# Patient Record
Sex: MALE | Race: BLACK OR AFRICAN AMERICAN | Employment: FULL TIME | URBAN - METROPOLITAN AREA
[De-identification: names, ages, dates, MRNs, and addresses within clinical notes are randomized per-mention and may not be internally consistent; named-entity substitution may affect disease eponyms.]

---

## 2017-02-18 LAB
ALBUMIN SERPL-MCNC: 4.5 G/DL
ALP BLD-CCNC: 52 U/L
ALT SERPL-CCNC: 14 U/L
AST SERPL-CCNC: 17 U/L
BILIRUB SERPL-MCNC: 0.9 MG/DL (ref 0.1–1.4)
BUN BLDV-MCNC: 19 MG/DL
CALCIUM SERPL-MCNC: 9.3 MG/DL
CHLORIDE BLD-SCNC: 103 MMOL/L
CHOLESTEROL, TOTAL: 193 MG/DL
CHOLESTEROL/HDL RATIO: ABNORMAL
CO2: 26 MMOL/L
CREAT SERPL-MCNC: 1.15 MG/DL
GFR CALCULATED: NORMAL
GLUCOSE BLD-MCNC: 69 MG/DL
HDLC SERPL-MCNC: 76 MG/DL (ref 35–70)
LDL CHOLESTEROL CALCULATED: 110 MG/DL (ref 0–160)
POTASSIUM SERPL-SCNC: 4.6 MMOL/L
SODIUM BLD-SCNC: 140 MMOL/L
TOTAL PROTEIN: 7.2
TRIGL SERPL-MCNC: 37 MG/DL
VLDLC SERPL CALC-MCNC: ABNORMAL MG/DL

## 2017-02-24 ENCOUNTER — OFFICE VISIT (OUTPATIENT)
Dept: FAMILY MEDICINE CLINIC | Age: 47
End: 2017-02-24

## 2017-02-24 VITALS
BODY MASS INDEX: 25.98 KG/M2 | SYSTOLIC BLOOD PRESSURE: 108 MMHG | HEIGHT: 73 IN | TEMPERATURE: 98.6 F | RESPIRATION RATE: 22 BRPM | HEART RATE: 103 BPM | OXYGEN SATURATION: 98 % | WEIGHT: 196 LBS | DIASTOLIC BLOOD PRESSURE: 70 MMHG

## 2017-02-24 DIAGNOSIS — R05.9 COUGH: Primary | ICD-10-CM

## 2017-02-24 DIAGNOSIS — R52 BODY ACHES: ICD-10-CM

## 2017-02-24 DIAGNOSIS — J20.9 ACUTE BRONCHITIS, UNSPECIFIED ORGANISM: ICD-10-CM

## 2017-02-24 LAB
INFLUENZA A ANTIBODY: NORMAL
INFLUENZA B ANTIBODY: NORMAL

## 2017-02-24 PROCEDURE — 87804 INFLUENZA ASSAY W/OPTIC: CPT | Performed by: NURSE PRACTITIONER

## 2017-02-24 PROCEDURE — 1036F TOBACCO NON-USER: CPT | Performed by: NURSE PRACTITIONER

## 2017-02-24 PROCEDURE — G8484 FLU IMMUNIZE NO ADMIN: HCPCS | Performed by: NURSE PRACTITIONER

## 2017-02-24 PROCEDURE — 99213 OFFICE O/P EST LOW 20 MIN: CPT | Performed by: NURSE PRACTITIONER

## 2017-02-24 PROCEDURE — G8427 DOCREV CUR MEDS BY ELIG CLIN: HCPCS | Performed by: NURSE PRACTITIONER

## 2017-02-24 PROCEDURE — G8419 CALC BMI OUT NRM PARAM NOF/U: HCPCS | Performed by: NURSE PRACTITIONER

## 2017-02-24 RX ORDER — GUAIFENESIN AND CODEINE PHOSPHATE 100; 10 MG/5ML; MG/5ML
5 SOLUTION ORAL EVERY 4 HOURS PRN
Qty: 120 ML | Refills: 0 | Status: SHIPPED | OUTPATIENT
Start: 2017-02-24 | End: 2017-03-03

## 2017-02-24 RX ORDER — CLARITHROMYCIN 500 MG/1
500 TABLET, COATED ORAL 2 TIMES DAILY
Qty: 20 TABLET | Refills: 0 | Status: SHIPPED | OUTPATIENT
Start: 2017-02-24 | End: 2017-03-06

## 2017-02-24 ASSESSMENT — PATIENT HEALTH QUESTIONNAIRE - PHQ9
SUM OF ALL RESPONSES TO PHQ9 QUESTIONS 1 & 2: 0
2. FEELING DOWN, DEPRESSED OR HOPELESS: 0
1. LITTLE INTEREST OR PLEASURE IN DOING THINGS: 0
SUM OF ALL RESPONSES TO PHQ QUESTIONS 1-9: 0

## 2017-02-27 ENCOUNTER — TELEPHONE (OUTPATIENT)
Dept: FAMILY MEDICINE CLINIC | Age: 47
End: 2017-02-27

## 2017-02-27 RX ORDER — AZITHROMYCIN 250 MG/1
TABLET, FILM COATED ORAL
Qty: 1 PACKET | Refills: 0 | Status: SHIPPED | OUTPATIENT
Start: 2017-02-27 | End: 2017-03-09

## 2017-03-12 ENCOUNTER — TELEPHONE (OUTPATIENT)
Dept: FAMILY MEDICINE CLINIC | Age: 47
End: 2017-03-12

## 2017-08-17 ENCOUNTER — OFFICE VISIT (OUTPATIENT)
Dept: FAMILY MEDICINE CLINIC | Age: 47
End: 2017-08-17

## 2017-08-17 VITALS
TEMPERATURE: 97.5 F | OXYGEN SATURATION: 97 % | HEART RATE: 74 BPM | DIASTOLIC BLOOD PRESSURE: 66 MMHG | WEIGHT: 201.5 LBS | SYSTOLIC BLOOD PRESSURE: 118 MMHG | BODY MASS INDEX: 26.71 KG/M2 | HEIGHT: 73 IN

## 2017-08-17 DIAGNOSIS — E78.5 MILD HYPERLIPIDEMIA: ICD-10-CM

## 2017-08-17 DIAGNOSIS — Z00.00 ANNUAL PHYSICAL EXAM: Primary | ICD-10-CM

## 2017-08-17 PROCEDURE — 99396 PREV VISIT EST AGE 40-64: CPT | Performed by: FAMILY MEDICINE

## 2017-08-17 ASSESSMENT — ENCOUNTER SYMPTOMS
EYE PAIN: 0
NAUSEA: 0
CHEST TIGHTNESS: 0
PHOTOPHOBIA: 0
WHEEZING: 0
EYE ITCHING: 0
VOICE CHANGE: 0
FACIAL SWELLING: 0
SINUS PRESSURE: 0
ANAL BLEEDING: 0
ABDOMINAL PAIN: 0
ABDOMINAL DISTENTION: 0
CHOKING: 0
BLOOD IN STOOL: 0
EYE REDNESS: 0
BACK PAIN: 0
COUGH: 0
APNEA: 0
EYE DISCHARGE: 0
SHORTNESS OF BREATH: 0
RHINORRHEA: 0
TROUBLE SWALLOWING: 0
DIARRHEA: 0
SORE THROAT: 0
CONSTIPATION: 0

## 2017-08-18 PROCEDURE — 90471 IMMUNIZATION ADMIN: CPT | Performed by: FAMILY MEDICINE

## 2017-08-18 PROCEDURE — 90715 TDAP VACCINE 7 YRS/> IM: CPT | Performed by: FAMILY MEDICINE

## 2018-06-21 ENCOUNTER — OFFICE VISIT (OUTPATIENT)
Dept: FAMILY MEDICINE CLINIC | Age: 48
End: 2018-06-21
Payer: COMMERCIAL

## 2018-06-21 VITALS
SYSTOLIC BLOOD PRESSURE: 128 MMHG | WEIGHT: 203.4 LBS | TEMPERATURE: 98.6 F | OXYGEN SATURATION: 98 % | DIASTOLIC BLOOD PRESSURE: 66 MMHG | BODY MASS INDEX: 26.96 KG/M2 | HEIGHT: 73 IN | RESPIRATION RATE: 16 BRPM | HEART RATE: 65 BPM

## 2018-06-21 DIAGNOSIS — H66.92 LEFT OTITIS MEDIA, UNSPECIFIED OTITIS MEDIA TYPE: Primary | ICD-10-CM

## 2018-06-21 PROCEDURE — G8427 DOCREV CUR MEDS BY ELIG CLIN: HCPCS | Performed by: FAMILY MEDICINE

## 2018-06-21 PROCEDURE — 1036F TOBACCO NON-USER: CPT | Performed by: FAMILY MEDICINE

## 2018-06-21 PROCEDURE — 99213 OFFICE O/P EST LOW 20 MIN: CPT | Performed by: FAMILY MEDICINE

## 2018-06-21 PROCEDURE — G8419 CALC BMI OUT NRM PARAM NOF/U: HCPCS | Performed by: FAMILY MEDICINE

## 2018-06-21 RX ORDER — CEFDINIR 300 MG/1
300 CAPSULE ORAL 2 TIMES DAILY
Qty: 20 CAPSULE | Refills: 0 | Status: SHIPPED | OUTPATIENT
Start: 2018-06-21 | End: 2018-07-01

## 2018-06-28 ASSESSMENT — ENCOUNTER SYMPTOMS
ABDOMINAL PAIN: 0
BLOOD IN STOOL: 0
DIARRHEA: 0
SORE THROAT: 0
SHORTNESS OF BREATH: 0
NAUSEA: 0
RHINORRHEA: 0
CHEST TIGHTNESS: 0
CONSTIPATION: 0
VOMITING: 0
COUGH: 0
APNEA: 0

## 2018-08-23 ENCOUNTER — HOSPITAL ENCOUNTER (OUTPATIENT)
Dept: LAB | Age: 48
Discharge: HOME | End: 2018-08-23
Payer: COMMERCIAL

## 2018-08-23 ENCOUNTER — OFFICE VISIT (OUTPATIENT)
Dept: FAMILY MEDICINE CLINIC | Age: 48
End: 2018-08-23
Payer: COMMERCIAL

## 2018-08-23 VITALS
SYSTOLIC BLOOD PRESSURE: 116 MMHG | OXYGEN SATURATION: 99 % | HEART RATE: 89 BPM | DIASTOLIC BLOOD PRESSURE: 62 MMHG | WEIGHT: 203.25 LBS | TEMPERATURE: 97.6 F | HEIGHT: 74 IN | RESPIRATION RATE: 18 BRPM | BODY MASS INDEX: 26.08 KG/M2

## 2018-08-23 DIAGNOSIS — Z00.00 ANNUAL PHYSICAL EXAM: ICD-10-CM

## 2018-08-23 DIAGNOSIS — Z00.00 ANNUAL PHYSICAL EXAM: Primary | ICD-10-CM

## 2018-08-23 DIAGNOSIS — J30.9 ALLERGIC RHINITIS, UNSPECIFIED SEASONALITY, UNSPECIFIED TRIGGER: ICD-10-CM

## 2018-08-23 LAB
ALBUMIN SERPL-MCNC: 4.4 G/DL (ref 3.9–4.9)
ALP BLD-CCNC: 54 U/L (ref 35–104)
ALT SERPL-CCNC: 22 U/L (ref 0–41)
ANION GAP SERPL CALCULATED.3IONS-SCNC: 12 MEQ/L (ref 7–13)
AST SERPL-CCNC: 21 U/L (ref 0–40)
BASOPHILS ABSOLUTE: 0 K/UL (ref 0–0.2)
BASOPHILS RELATIVE PERCENT: 0.4 %
BILIRUB SERPL-MCNC: 0.8 MG/DL (ref 0–1.2)
BUN BLDV-MCNC: 16 MG/DL (ref 6–20)
CALCIUM SERPL-MCNC: 9.2 MG/DL (ref 8.6–10.2)
CHLORIDE BLD-SCNC: 103 MEQ/L (ref 98–107)
CHOLESTEROL, TOTAL: 201 MG/DL (ref 0–199)
CO2: 27 MEQ/L (ref 22–29)
CREAT SERPL-MCNC: 1.23 MG/DL (ref 0.7–1.2)
EOSINOPHILS ABSOLUTE: 0.1 K/UL (ref 0–0.7)
EOSINOPHILS RELATIVE PERCENT: 2.2 %
GFR AFRICAN AMERICAN: >60
GFR NON-AFRICAN AMERICAN: >60
GLOBULIN: 3.1 G/DL (ref 2.3–3.5)
GLUCOSE BLD-MCNC: 89 MG/DL (ref 74–109)
HCT VFR BLD CALC: 41.3 % (ref 42–52)
HDLC SERPL-MCNC: 67 MG/DL (ref 40–59)
HEMOGLOBIN: 14.2 G/DL (ref 14–18)
LDL CHOLESTEROL CALCULATED: 123 MG/DL (ref 0–129)
LYMPHOCYTES ABSOLUTE: 1.8 K/UL (ref 1–4.8)
LYMPHOCYTES RELATIVE PERCENT: 38.8 %
MCH RBC QN AUTO: 34.2 PG (ref 27–31.3)
MCHC RBC AUTO-ENTMCNC: 34.3 % (ref 33–37)
MCV RBC AUTO: 99.6 FL (ref 80–100)
MONOCYTES ABSOLUTE: 0.5 K/UL (ref 0.2–0.8)
MONOCYTES RELATIVE PERCENT: 9.6 %
NEUTROPHILS ABSOLUTE: 2.3 K/UL (ref 1.4–6.5)
NEUTROPHILS RELATIVE PERCENT: 49 %
PDW BLD-RTO: 12.6 % (ref 11.5–14.5)
PLATELET # BLD: 257 K/UL (ref 130–400)
POTASSIUM SERPL-SCNC: 4.8 MEQ/L (ref 3.5–5.1)
PROSTATE SPECIFIC ANTIGEN: 0.79 NG/ML
RBC # BLD: 4.14 M/UL (ref 4.7–6.1)
SODIUM BLD-SCNC: 142 MEQ/L (ref 132–144)
TOTAL PROTEIN: 7.5 G/DL (ref 6.4–8.1)
TRIGL SERPL-MCNC: 53 MG/DL (ref 0–200)
WBC # BLD: 4.7 K/UL (ref 4.8–10.8)

## 2018-08-23 PROCEDURE — 80061 LIPID PANEL: CPT

## 2018-08-23 PROCEDURE — 85025 COMPLETE CBC W/AUTO DIFF WBC: CPT

## 2018-08-23 PROCEDURE — 80053 COMPREHEN METABOLIC PANEL: CPT

## 2018-08-23 PROCEDURE — 36415 COLL VENOUS BLD VENIPUNCTURE: CPT

## 2018-08-23 PROCEDURE — 99396 PREV VISIT EST AGE 40-64: CPT | Performed by: FAMILY MEDICINE

## 2018-08-23 PROCEDURE — G0103 PSA SCREENING: HCPCS

## 2018-08-23 RX ORDER — FLUTICASONE PROPIONATE 50 MCG
1 SPRAY, SUSPENSION (ML) NASAL DAILY
Qty: 1 BOTTLE | Refills: 3 | Status: SHIPPED | OUTPATIENT
Start: 2018-08-23 | End: 2020-01-14

## 2018-08-23 RX ORDER — M-VIT,TX,IRON,MINS/CALC/FOLIC 27MG-0.4MG
1 TABLET ORAL DAILY
COMMUNITY
End: 2020-01-14

## 2018-08-23 ASSESSMENT — ENCOUNTER SYMPTOMS
EYE DISCHARGE: 0
RHINORRHEA: 0
SHORTNESS OF BREATH: 0
WHEEZING: 0
COUGH: 0
ABDOMINAL DISTENTION: 0
SINUS PRESSURE: 0
BLOOD IN STOOL: 0
EYE ITCHING: 0
NAUSEA: 0
EYE REDNESS: 0
ANAL BLEEDING: 0
SORE THROAT: 0
CONSTIPATION: 0
CHOKING: 0
ABDOMINAL PAIN: 0
APNEA: 0
DIARRHEA: 0
BACK PAIN: 0
TROUBLE SWALLOWING: 0
EYE PAIN: 0
CHEST TIGHTNESS: 0
VOICE CHANGE: 0
PHOTOPHOBIA: 0
FACIAL SWELLING: 0

## 2018-08-23 NOTE — PROGRESS NOTES
The 10-year ASCVD risk score (Talisha Gillette et al., 2013) is: 3.2%    Values used to calculate the score:      Age: 56 years      Sex: Male      Is Non- : Yes      Diabetic: No      Tobacco smoker: No      Systolic Blood Pressure: 770 mmHg      Is BP treated: No      HDL Cholesterol: 76 mg/dL      Total Cholesterol: 193 mg/dL

## 2018-08-29 DIAGNOSIS — N28.9 ACUTE KIDNEY INSUFFICIENCY: Primary | ICD-10-CM

## 2018-09-05 ENCOUNTER — TELEPHONE (OUTPATIENT)
Dept: FAMILY MEDICINE CLINIC | Age: 48
End: 2018-09-05

## 2018-09-05 NOTE — TELEPHONE ENCOUNTER
Patient called wanted to know his labs so I gave him the message and would like the labs faxed over to [facility]. cp

## 2018-09-24 ENCOUNTER — OFFICE VISIT (OUTPATIENT)
Dept: FAMILY MEDICINE CLINIC | Age: 48
End: 2018-09-24
Payer: COMMERCIAL

## 2018-09-24 VITALS
HEART RATE: 77 BPM | RESPIRATION RATE: 20 BRPM | TEMPERATURE: 96.8 F | DIASTOLIC BLOOD PRESSURE: 80 MMHG | HEIGHT: 73 IN | SYSTOLIC BLOOD PRESSURE: 122 MMHG | WEIGHT: 206 LBS | OXYGEN SATURATION: 97 % | BODY MASS INDEX: 27.3 KG/M2

## 2018-09-24 DIAGNOSIS — J06.9 VIRAL URI: Primary | ICD-10-CM

## 2018-09-24 PROCEDURE — 1036F TOBACCO NON-USER: CPT | Performed by: NURSE PRACTITIONER

## 2018-09-24 PROCEDURE — G8419 CALC BMI OUT NRM PARAM NOF/U: HCPCS | Performed by: NURSE PRACTITIONER

## 2018-09-24 PROCEDURE — 99213 OFFICE O/P EST LOW 20 MIN: CPT | Performed by: NURSE PRACTITIONER

## 2018-09-24 PROCEDURE — G8427 DOCREV CUR MEDS BY ELIG CLIN: HCPCS | Performed by: NURSE PRACTITIONER

## 2018-09-24 RX ORDER — DEXTROMETHORPHAN HYDROBROMIDE AND PROMETHAZINE HYDROCHLORIDE 15; 6.25 MG/5ML; MG/5ML
5 SYRUP ORAL 4 TIMES DAILY PRN
Qty: 150 ML | Refills: 0 | Status: SHIPPED | OUTPATIENT
Start: 2018-09-24 | End: 2018-10-01

## 2018-09-24 RX ORDER — CETIRIZINE HYDROCHLORIDE, PSEUDOEPHEDRINE HYDROCHLORIDE 5; 120 MG/1; MG/1
1 TABLET, FILM COATED, EXTENDED RELEASE ORAL 2 TIMES DAILY
Qty: 60 TABLET | Refills: 0 | Status: SHIPPED | OUTPATIENT
Start: 2018-09-24 | End: 2018-10-24

## 2018-09-24 ASSESSMENT — ENCOUNTER SYMPTOMS
SHORTNESS OF BREATH: 0
DIARRHEA: 0
SINUS PAIN: 0
RHINORRHEA: 1
VOMITING: 0
BLOOD IN STOOL: 0
SORE THROAT: 0
NAUSEA: 0
ANAL BLEEDING: 0
CHEST TIGHTNESS: 0
WHEEZING: 0
SWOLLEN GLANDS: 0
ABDOMINAL DISTENTION: 0
COUGH: 1
CONSTIPATION: 0
ABDOMINAL PAIN: 0

## 2018-09-24 NOTE — PROGRESS NOTES
muscle usage. No respiratory distress. He has no decreased breath sounds. He has no wheezes. He has no rhonchi. He has no rales. He exhibits no tenderness. Lymphadenopathy:     He has no cervical adenopathy. Neurological: He is alert and oriented to person, place, and time. Skin: Skin is warm and dry. No rash noted. He is not diaphoretic. Psychiatric: He has a normal mood and affect. His behavior is normal.     Assessment & Plan:      Diagnosis Orders   1. Viral URI  cetirizine-psuedoephedrine (ZYRTEC-D) 5-120 MG per extended release tablet    promethazine-dextromethorphan (PROMETHAZINE-DM) 6.25-15 MG/5ML syrup     Orders Placed This Encounter   Medications    cetirizine-psuedoephedrine (ZYRTEC-D) 5-120 MG per extended release tablet     Sig: Take 1 tablet by mouth 2 times daily     Dispense:  60 tablet     Refill:  0    promethazine-dextromethorphan (PROMETHAZINE-DM) 6.25-15 MG/5ML syrup     Sig: Take 5 mLs by mouth 4 times daily as needed for Cough     Dispense:  150 mL     Refill:  0     Return if symptoms worsen or fail to improve, for follow up with PCP. No signs of bacterial infection on exam. Patient with likely viral URI based on symptoms and reported history. Patient to use supportive care at home - tylenol or ibuprofen (if not allergic or contraindicated based on medical history or other medication), increased fluids/rest, salt water gargles, throat lozenges, mucinex OTC BID x 5 days. Instructed to return to office if symptoms worsen or do not improve over the next 7-10 days. Reviewed with the patient: current clinical status, medications, activities and diet. Side effects, adverse effects of the medication prescribed today, as well as treatment plan/ rationale and result expectations have been discussed with the patient who expresses understanding and desires to proceed.   Pt instructions reviewed and given to patient.     Close follow up to evaluate treatment results and for

## 2019-02-09 ENCOUNTER — OFFICE VISIT (OUTPATIENT)
Dept: FAMILY MEDICINE CLINIC | Age: 49
End: 2019-02-09
Payer: COMMERCIAL

## 2019-02-09 VITALS
SYSTOLIC BLOOD PRESSURE: 110 MMHG | WEIGHT: 203 LBS | TEMPERATURE: 98 F | HEIGHT: 73 IN | DIASTOLIC BLOOD PRESSURE: 60 MMHG | BODY MASS INDEX: 26.9 KG/M2 | HEART RATE: 70 BPM | OXYGEN SATURATION: 98 % | RESPIRATION RATE: 18 BRPM

## 2019-02-09 DIAGNOSIS — J01.10 ACUTE NON-RECURRENT FRONTAL SINUSITIS: Primary | ICD-10-CM

## 2019-02-09 PROCEDURE — 99213 OFFICE O/P EST LOW 20 MIN: CPT | Performed by: NURSE PRACTITIONER

## 2019-02-09 PROCEDURE — G8427 DOCREV CUR MEDS BY ELIG CLIN: HCPCS | Performed by: NURSE PRACTITIONER

## 2019-02-09 PROCEDURE — G8419 CALC BMI OUT NRM PARAM NOF/U: HCPCS | Performed by: NURSE PRACTITIONER

## 2019-02-09 PROCEDURE — G8484 FLU IMMUNIZE NO ADMIN: HCPCS | Performed by: NURSE PRACTITIONER

## 2019-02-09 PROCEDURE — 1036F TOBACCO NON-USER: CPT | Performed by: NURSE PRACTITIONER

## 2019-02-09 RX ORDER — DOXYCYCLINE HYCLATE 100 MG
100 TABLET ORAL 2 TIMES DAILY
Qty: 14 TABLET | Refills: 0 | Status: SHIPPED | OUTPATIENT
Start: 2019-02-09 | End: 2019-02-16

## 2019-02-09 ASSESSMENT — ENCOUNTER SYMPTOMS
CONSTIPATION: 0
SWOLLEN GLANDS: 0
SORE THROAT: 1
NAUSEA: 0
CHEST TIGHTNESS: 0
VOMITING: 0
ABDOMINAL DISTENTION: 0
WHEEZING: 0
SINUS PRESSURE: 1
SHORTNESS OF BREATH: 0
ABDOMINAL PAIN: 0
COUGH: 0
BLOOD IN STOOL: 0
HOARSE VOICE: 0
DIARRHEA: 0
ANAL BLEEDING: 0

## 2019-02-09 ASSESSMENT — PATIENT HEALTH QUESTIONNAIRE - PHQ9
2. FEELING DOWN, DEPRESSED OR HOPELESS: 0
SUM OF ALL RESPONSES TO PHQ QUESTIONS 1-9: 0
SUM OF ALL RESPONSES TO PHQ9 QUESTIONS 1 & 2: 0
1. LITTLE INTEREST OR PLEASURE IN DOING THINGS: 0
SUM OF ALL RESPONSES TO PHQ QUESTIONS 1-9: 0

## 2019-05-10 ENCOUNTER — OFFICE VISIT (OUTPATIENT)
Dept: FAMILY MEDICINE CLINIC | Age: 49
End: 2019-05-10
Payer: COMMERCIAL

## 2019-05-10 VITALS
BODY MASS INDEX: 27.5 KG/M2 | HEART RATE: 66 BPM | OXYGEN SATURATION: 98 % | SYSTOLIC BLOOD PRESSURE: 120 MMHG | HEIGHT: 72 IN | RESPIRATION RATE: 16 BRPM | DIASTOLIC BLOOD PRESSURE: 80 MMHG | WEIGHT: 203 LBS | TEMPERATURE: 97.6 F

## 2019-05-10 DIAGNOSIS — H61.23 BILATERAL IMPACTED CERUMEN: Primary | ICD-10-CM

## 2019-05-10 DIAGNOSIS — H93.8X3 EAR CONGESTION, BILATERAL: ICD-10-CM

## 2019-05-10 PROCEDURE — 1036F TOBACCO NON-USER: CPT | Performed by: PHYSICIAN ASSISTANT

## 2019-05-10 PROCEDURE — 69210 REMOVE IMPACTED EAR WAX UNI: CPT | Performed by: PHYSICIAN ASSISTANT

## 2019-05-10 PROCEDURE — G8427 DOCREV CUR MEDS BY ELIG CLIN: HCPCS | Performed by: PHYSICIAN ASSISTANT

## 2019-05-10 PROCEDURE — 99213 OFFICE O/P EST LOW 20 MIN: CPT | Performed by: PHYSICIAN ASSISTANT

## 2019-05-10 PROCEDURE — G8419 CALC BMI OUT NRM PARAM NOF/U: HCPCS | Performed by: PHYSICIAN ASSISTANT

## 2019-05-10 ASSESSMENT — ENCOUNTER SYMPTOMS
VOMITING: 0
SORE THROAT: 0
ABDOMINAL PAIN: 0
COUGH: 0
RHINORRHEA: 0
DIARRHEA: 0

## 2019-05-10 NOTE — PROGRESS NOTES
Subjective     Fredrick Corona Sr. 53 y.o. male presents 5/10/19 with   Chief Complaint   Patient presents with   Juan Ruiz     pt comes in today with complaint of left ear pain for past 2 days---ah       Otalgia    There is pain in the left ear. This is a new problem. The current episode started in the past 7 days. The problem occurs constantly. The problem has been unchanged. There has been no fever. Associated symptoms include hearing loss. Pertinent negatives include no abdominal pain, coughing, diarrhea, ear discharge, headaches, neck pain, rash, rhinorrhea, sore throat or vomiting. He has tried nothing for the symptoms. Reviewed thefollowing history:    Past Medical History:   Diagnosis Date    Allergic rhinitis     Mild anemia     Mild hyperlipidemia      Past Surgical History:   Procedure Laterality Date    WISDOM TOOTH EXTRACTION       Family History   Problem Relation Age of Onset    Emphysema Father        Allergies   Allergen Reactions    Pcn [Penicillins] Nausea And Vomiting       Current Outpatient Medications   Medication Sig Dispense Refill    Multiple Vitamins-Minerals (THERAPEUTIC MULTIVITAMIN-MINERALS) tablet Take 1 tablet by mouth daily      fluticasone (FLONASE) 50 MCG/ACT nasal spray 1 spray by Nasal route daily 1 Bottle 3     No current facility-administered medications for this visit. Review of Systems   HENT: Positive for ear pain and hearing loss. Negative for ear discharge, rhinorrhea and sore throat. Respiratory: Negative for cough. Gastrointestinal: Negative for abdominal pain, diarrhea and vomiting. Musculoskeletal: Negative for neck pain. Skin: Negative for rash. Neurological: Negative for headaches.        Objective    Vitals:    05/10/19 1611   BP: 120/80   Site: Left Upper Arm   Position: Sitting   Cuff Size: Small Adult   Pulse: 66   Resp: 16   Temp: 97.6 °F (36.4 °C)   TempSrc: Temporal   SpO2: 98%   Weight: 203 lb (92.1 kg)   Height: 6' (1.829 m) Physical Exam   Constitutional: He appears well-developed and well-nourished. No distress. HENT:   Right Ear: External ear normal.   Left Ear: External ear normal.   Mouth/Throat: No oropharyngeal exudate. Bilateral cerumen impaction   Eyes: Pupils are equal, round, and reactive to light. Cardiovascular: Normal rate and regular rhythm. Pulmonary/Chest: Effort normal and breath sounds normal.   Skin: He is not diaphoretic. Vitals reviewed. Assessment and Plan      ICD-10-CM    1. Bilateral impacted cerumen H61.23 27191 - [place] REMOVE IMPACTED EAR WAX   2. Ear congestion, bilateral H93.8X3        Orders Placed This Encounter   Procedures    56477 - [place] REMOVE IMPACTED EAR WAX     Both ear canals were irrigated with a 1:1 mixture of warm water and hydrogen peroxide. Soft brown wax was removed. The patient tolerated the procedure well without any adverse side effects. After the irrigation, both ear canals were clearly visualized by otoscope. No signs of infection were present in either ear canal or tympanic membrane. Use Flonase nightly until inner ear congestion improves. No orders of the defined types were placed in this encounter. Reviewed with the patient: current clinicalstatus, medications, activities and diet. Side effects, adverse effects of the medication prescribed today, as well as treatment plan and result expectations have been discussed with the patient who expressesunderstanding and desires to proceed. Close follow up to evaluate treatment results and for coordination of care. I have reviewed the patient's medical history in detail and updated the computerized patientrecord. Return if symptoms worsen or fail to improve, for follow up with PCP.     HARRIS Nova

## 2019-08-23 ENCOUNTER — HOSPITAL ENCOUNTER (OUTPATIENT)
Dept: LAB | Age: 49
Discharge: HOME | End: 2019-08-23
Payer: COMMERCIAL

## 2019-08-23 ENCOUNTER — OFFICE VISIT (OUTPATIENT)
Dept: FAMILY MEDICINE CLINIC | Age: 49
End: 2019-08-23
Payer: COMMERCIAL

## 2019-08-23 VITALS
HEIGHT: 72 IN | WEIGHT: 211.4 LBS | BODY MASS INDEX: 28.63 KG/M2 | SYSTOLIC BLOOD PRESSURE: 128 MMHG | OXYGEN SATURATION: 99 % | TEMPERATURE: 97.2 F | DIASTOLIC BLOOD PRESSURE: 84 MMHG | HEART RATE: 81 BPM | RESPIRATION RATE: 15 BRPM

## 2019-08-23 DIAGNOSIS — Z00.00 ANNUAL PHYSICAL EXAM: ICD-10-CM

## 2019-08-23 DIAGNOSIS — Z00.00 ANNUAL PHYSICAL EXAM: Primary | ICD-10-CM

## 2019-08-23 LAB
ALBUMIN SERPL-MCNC: 4.2 G/DL (ref 3.5–4.6)
ALP BLD-CCNC: 54 U/L (ref 35–104)
ALT SERPL-CCNC: 29 U/L (ref 0–41)
ANION GAP SERPL CALCULATED.3IONS-SCNC: 12 MEQ/L (ref 9–15)
AST SERPL-CCNC: 23 U/L (ref 0–40)
BASOPHILS ABSOLUTE: 0 K/UL (ref 0–0.2)
BASOPHILS RELATIVE PERCENT: 0.6 %
BILIRUB SERPL-MCNC: 0.6 MG/DL (ref 0.2–0.7)
BUN BLDV-MCNC: 15 MG/DL (ref 6–20)
CALCIUM SERPL-MCNC: 9.1 MG/DL (ref 8.5–9.9)
CHLORIDE BLD-SCNC: 101 MEQ/L (ref 95–107)
CHOLESTEROL, TOTAL: 180 MG/DL (ref 0–199)
CO2: 27 MEQ/L (ref 20–31)
CREAT SERPL-MCNC: 1.11 MG/DL (ref 0.7–1.2)
CREATININE URINE: 176.3 MG/DL
EOSINOPHILS ABSOLUTE: 0.1 K/UL (ref 0–0.7)
EOSINOPHILS RELATIVE PERCENT: 2.2 %
GFR AFRICAN AMERICAN: >60
GFR NON-AFRICAN AMERICAN: >60
GLOBULIN: 3.2 G/DL (ref 2.3–3.5)
GLUCOSE BLD-MCNC: 92 MG/DL (ref 70–99)
HCT VFR BLD CALC: 40.6 % (ref 42–52)
HDLC SERPL-MCNC: 64 MG/DL (ref 40–59)
HEMOGLOBIN: 13.6 G/DL (ref 14–18)
LDL CHOLESTEROL CALCULATED: 104 MG/DL (ref 0–129)
LYMPHOCYTES ABSOLUTE: 1.7 K/UL (ref 1–4.8)
LYMPHOCYTES RELATIVE PERCENT: 42.3 %
MCH RBC QN AUTO: 33.7 PG (ref 27–31.3)
MCHC RBC AUTO-ENTMCNC: 33.5 % (ref 33–37)
MCV RBC AUTO: 100.5 FL (ref 80–100)
MICROALBUMIN UR-MCNC: <1.2 MG/DL
MICROALBUMIN/CREAT UR-RTO: NORMAL MG/G (ref 0–30)
MONOCYTES ABSOLUTE: 0.4 K/UL (ref 0.2–0.8)
MONOCYTES RELATIVE PERCENT: 10.3 %
NEUTROPHILS ABSOLUTE: 1.8 K/UL (ref 1.4–6.5)
NEUTROPHILS RELATIVE PERCENT: 44.6 %
PDW BLD-RTO: 12.7 % (ref 11.5–14.5)
PLATELET # BLD: 261 K/UL (ref 130–400)
POTASSIUM SERPL-SCNC: 4.8 MEQ/L (ref 3.4–4.9)
PROSTATE SPECIFIC ANTIGEN: 0.85 NG/ML
RBC # BLD: 4.04 M/UL (ref 4.7–6.1)
SODIUM BLD-SCNC: 140 MEQ/L (ref 135–144)
TOTAL PROTEIN: 7.4 G/DL (ref 6.3–8)
TRIGL SERPL-MCNC: 62 MG/DL (ref 0–150)
WBC # BLD: 4.1 K/UL (ref 4.8–10.8)

## 2019-08-23 PROCEDURE — 80061 LIPID PANEL: CPT

## 2019-08-23 PROCEDURE — 80053 COMPREHEN METABOLIC PANEL: CPT

## 2019-08-23 PROCEDURE — 82570 ASSAY OF URINE CREATININE: CPT

## 2019-08-23 PROCEDURE — 82607 VITAMIN B-12: CPT

## 2019-08-23 PROCEDURE — 36415 COLL VENOUS BLD VENIPUNCTURE: CPT

## 2019-08-23 PROCEDURE — 82043 UR ALBUMIN QUANTITATIVE: CPT

## 2019-08-23 PROCEDURE — 85025 COMPLETE CBC W/AUTO DIFF WBC: CPT

## 2019-08-23 PROCEDURE — 99396 PREV VISIT EST AGE 40-64: CPT | Performed by: FAMILY MEDICINE

## 2019-08-23 PROCEDURE — 84153 ASSAY OF PSA TOTAL: CPT

## 2019-08-23 PROCEDURE — 82746 ASSAY OF FOLIC ACID SERUM: CPT

## 2019-08-23 ASSESSMENT — ENCOUNTER SYMPTOMS
SORE THROAT: 0
EYE PAIN: 0
NAUSEA: 0
CHEST TIGHTNESS: 0
PHOTOPHOBIA: 0
WHEEZING: 0
ABDOMINAL PAIN: 0
COUGH: 0
BLOOD IN STOOL: 0
VOICE CHANGE: 0
ANAL BLEEDING: 0
EYE DISCHARGE: 0
TROUBLE SWALLOWING: 0
APNEA: 0
SINUS PRESSURE: 0
ABDOMINAL DISTENTION: 0
SHORTNESS OF BREATH: 0
DIARRHEA: 0
BACK PAIN: 0
CHOKING: 0
EYE ITCHING: 0
FACIAL SWELLING: 0
RHINORRHEA: 0
CONSTIPATION: 0
EYE REDNESS: 0

## 2019-08-26 DIAGNOSIS — D53.9 MACROCYTIC ANEMIA: Primary | ICD-10-CM

## 2019-08-27 LAB
FOLATE: 13 NG/ML (ref 7.3–26.1)
VITAMIN B-12: 282 PG/ML (ref 232–1245)

## 2019-11-29 ENCOUNTER — OFFICE VISIT (OUTPATIENT)
Dept: FAMILY MEDICINE CLINIC | Age: 49
End: 2019-11-29
Payer: COMMERCIAL

## 2019-11-29 VITALS
HEART RATE: 75 BPM | WEIGHT: 212 LBS | HEIGHT: 72 IN | OXYGEN SATURATION: 98 % | TEMPERATURE: 97.5 F | RESPIRATION RATE: 16 BRPM | DIASTOLIC BLOOD PRESSURE: 70 MMHG | BODY MASS INDEX: 28.71 KG/M2 | SYSTOLIC BLOOD PRESSURE: 110 MMHG

## 2019-11-29 DIAGNOSIS — H61.23 BILATERAL IMPACTED CERUMEN: Primary | ICD-10-CM

## 2019-11-29 PROCEDURE — 69210 REMOVE IMPACTED EAR WAX UNI: CPT | Performed by: PHYSICIAN ASSISTANT

## 2019-11-29 PROCEDURE — 99213 OFFICE O/P EST LOW 20 MIN: CPT | Performed by: PHYSICIAN ASSISTANT

## 2019-11-29 PROCEDURE — G8427 DOCREV CUR MEDS BY ELIG CLIN: HCPCS | Performed by: PHYSICIAN ASSISTANT

## 2019-11-29 PROCEDURE — 1036F TOBACCO NON-USER: CPT | Performed by: PHYSICIAN ASSISTANT

## 2019-11-29 PROCEDURE — G8484 FLU IMMUNIZE NO ADMIN: HCPCS | Performed by: PHYSICIAN ASSISTANT

## 2019-11-29 PROCEDURE — G8419 CALC BMI OUT NRM PARAM NOF/U: HCPCS | Performed by: PHYSICIAN ASSISTANT

## 2019-11-30 ASSESSMENT — ENCOUNTER SYMPTOMS
ABDOMINAL PAIN: 0
SORE THROAT: 0
COUGH: 0

## 2020-01-14 ENCOUNTER — OFFICE VISIT (OUTPATIENT)
Dept: FAMILY MEDICINE CLINIC | Age: 50
End: 2020-01-14
Payer: COMMERCIAL

## 2020-01-14 VITALS
OXYGEN SATURATION: 99 % | BODY MASS INDEX: 28.31 KG/M2 | DIASTOLIC BLOOD PRESSURE: 84 MMHG | SYSTOLIC BLOOD PRESSURE: 134 MMHG | RESPIRATION RATE: 16 BRPM | HEIGHT: 73 IN | WEIGHT: 213.6 LBS | TEMPERATURE: 98.4 F | HEART RATE: 82 BPM

## 2020-01-14 PROCEDURE — 1036F TOBACCO NON-USER: CPT | Performed by: PHYSICIAN ASSISTANT

## 2020-01-14 PROCEDURE — 99213 OFFICE O/P EST LOW 20 MIN: CPT | Performed by: PHYSICIAN ASSISTANT

## 2020-01-14 PROCEDURE — G8427 DOCREV CUR MEDS BY ELIG CLIN: HCPCS | Performed by: PHYSICIAN ASSISTANT

## 2020-01-14 PROCEDURE — G8484 FLU IMMUNIZE NO ADMIN: HCPCS | Performed by: PHYSICIAN ASSISTANT

## 2020-01-14 PROCEDURE — G8419 CALC BMI OUT NRM PARAM NOF/U: HCPCS | Performed by: PHYSICIAN ASSISTANT

## 2020-01-14 RX ORDER — DOXYCYCLINE HYCLATE 100 MG
100 TABLET ORAL 2 TIMES DAILY
Qty: 20 TABLET | Refills: 0 | Status: SHIPPED | OUTPATIENT
Start: 2020-01-14 | End: 2020-01-24

## 2020-01-14 ASSESSMENT — ENCOUNTER SYMPTOMS
CHEST TIGHTNESS: 0
SORE THROAT: 0
APNEA: 0
SHORTNESS OF BREATH: 0
STRIDOR: 0
NAUSEA: 0
SINUS PAIN: 1
COUGH: 1
CHOKING: 0
RHINORRHEA: 0
WHEEZING: 1
SWOLLEN GLANDS: 0
VOMITING: 0
DIARRHEA: 0

## 2020-01-14 ASSESSMENT — PATIENT HEALTH QUESTIONNAIRE - PHQ9
1. LITTLE INTEREST OR PLEASURE IN DOING THINGS: 0
2. FEELING DOWN, DEPRESSED OR HOPELESS: 0
SUM OF ALL RESPONSES TO PHQ QUESTIONS 1-9: 0
SUM OF ALL RESPONSES TO PHQ9 QUESTIONS 1 & 2: 0
SUM OF ALL RESPONSES TO PHQ QUESTIONS 1-9: 0

## 2020-01-14 NOTE — PROGRESS NOTES
Subjective     Selvin Lenz Sr. 51 y.o. male presents 1/14/20 with   Chief Complaint   Patient presents with    Chest Congestion     Patient presents today with chest congestion, dry cough and chest tightness with cough. Patient states this has been ongoing x2 weeks. Patient also has had some sinus/head pressure. Patient has taken sudafed to help relieve sx's. URI    This is a new problem. The current episode started in the past 7 days. The problem has been gradually worsening. There has been no fever. Associated symptoms include congestion, coughing (productive), sinus pain and wheezing. Pertinent negatives include no chest pain, diarrhea, dysuria, ear pain, joint pain, nausea, plugged ear sensation, rhinorrhea, sneezing, sore throat, swollen glands or vomiting. Treatments tried: sudafed. The treatment provided mild relief. Reviewed the following history:    Past Medical History:   Diagnosis Date    Allergic rhinitis     Mild anemia     Mild hyperlipidemia      Past Surgical History:   Procedure Laterality Date    WISDOM TOOTH EXTRACTION       Family History   Problem Relation Age of Onset    Emphysema Father        Allergies   Allergen Reactions    Pcn [Penicillins] Nausea And Vomiting       Current Outpatient Medications   Medication Sig Dispense Refill    doxycycline hyclate (VIBRA-TABS) 100 MG tablet Take 1 tablet by mouth 2 times daily for 10 days 20 tablet 0     No current facility-administered medications for this visit. Review of Systems   Constitutional: Negative for chills and fever. HENT: Positive for congestion and sinus pain. Negative for ear pain, rhinorrhea, sneezing and sore throat. Respiratory: Positive for cough (productive) and wheezing. Negative for apnea, choking, chest tightness, shortness of breath and stridor. Cardiovascular: Negative for chest pain. Gastrointestinal: Negative for diarrhea, nausea and vomiting. Genitourinary: Negative for dysuria. Musculoskeletal: Negative for joint pain. Objective    Vitals:    01/14/20 1538   BP: 134/84   Site: Right Upper Arm   Position: Sitting   Cuff Size: Medium Adult   Pulse: 82   Resp: 16   Temp: 98.4 °F (36.9 °C)   TempSrc: Temporal   SpO2: 99%   Weight: 213 lb 9.6 oz (96.9 kg)   Height: 6' 1\" (1.854 m)       Physical Exam  Vitals signs reviewed. Constitutional:       General: He is not in acute distress. Appearance: Normal appearance. He is well-developed. He is not ill-appearing, toxic-appearing or diaphoretic. HENT:      Head: Normocephalic and atraumatic. Right Ear: Ear canal normal. A middle ear effusion (clear fluid) is present. Left Ear: Ear canal normal. A middle ear effusion (clear fluid) is present. Nose: Mucosal edema and rhinorrhea present. Mouth/Throat:      Pharynx: Uvula midline. Posterior oropharyngeal erythema (cobblestone appearance) present. No oropharyngeal exudate. Tonsils: No tonsillar exudate. Eyes:      General:         Right eye: No discharge. Left eye: No discharge. Conjunctiva/sclera: Conjunctivae normal.   Cardiovascular:      Rate and Rhythm: Normal rate and regular rhythm. Heart sounds: Normal heart sounds. Pulmonary:      Effort: Pulmonary effort is normal.      Breath sounds: Normal breath sounds. Lymphadenopathy:      Cervical: No cervical adenopathy. Skin:     General: Skin is warm. Coloration: Skin is not pale. Findings: No erythema or rash. Neurological:      General: No focal deficit present. Mental Status: He is alert. Assessment and Plan      ICD-10-CM    1. Acute bronchitis, unspecified organism J20.9    2.  Acute URI J06.9        Orders Placed This Encounter   Medications    doxycycline hyclate (VIBRA-TABS) 100 MG tablet     Sig: Take 1 tablet by mouth 2 times daily for 10 days     Dispense:  20 tablet     Refill:  0     Dayquil/nyquil, flonase, fluids    Reviewed with the patient: current clinicalstatus, medications, activities and diet. Side effects, adverse effects of the medication prescribed today, as well as treatment plan and result expectations have been discussed with the patient who expressesunderstanding and desires to proceed. Close follow up to evaluate treatment results and for coordination of care. I have reviewed the patient's medical history in detail and updated the computerized patientrecord. Return if symptoms worsen or fail to improve, for follow up with PCP.     HARRIS Schwarz

## 2020-01-14 NOTE — PATIENT INSTRUCTIONS
Patient Education        Bronchitis: Care Instructions  Your Care Instructions    Bronchitis is inflammation of the bronchial tubes, which carry air to the lungs. The tubes swell and produce mucus, or phlegm. The mucus and inflamed bronchial tubes make you cough. You may have trouble breathing. Most cases of bronchitis are caused by viruses like those that cause colds. Antibiotics usually do not help and they may be harmful. Bronchitis usually develops rapidly and lasts about 2 to 3 weeks in otherwise healthy people. Follow-up care is a key part of your treatment and safety. Be sure to make and go to all appointments, and call your doctor if you are having problems. It's also a good idea to know your test results and keep a list of the medicines you take. How can you care for yourself at home? · Take all medicines exactly as prescribed. Call your doctor if you think you are having a problem with your medicine. · Get some extra rest.  · Take an over-the-counter pain medicine, such as acetaminophen (Tylenol), ibuprofen (Advil, Motrin), or naproxen (Aleve) to reduce fever and relieve body aches. Read and follow all instructions on the label. · Do not take two or more pain medicines at the same time unless the doctor told you to. Many pain medicines have acetaminophen, which is Tylenol. Too much acetaminophen (Tylenol) can be harmful. · Take an over-the-counter cough medicine that contains dextromethorphan to help quiet a dry, hacking cough so that you can sleep. Avoid cough medicines that have more than one active ingredient. Read and follow all instructions on the label. · Breathe moist air from a humidifier, hot shower, or sink filled with hot water. The heat and moisture will thin mucus so you can cough it out. · Do not smoke. Smoking can make bronchitis worse. If you need help quitting, talk to your doctor about stop-smoking programs and medicines.  These can increase your chances of quitting for good.  When should you call for help? Call 911 anytime you think you may need emergency care. For example, call if:    · You have severe trouble breathing.    Call your doctor now or seek immediate medical care if:    · You have new or worse trouble breathing.     · You cough up dark brown or bloody mucus (sputum).     · You have a new or higher fever.     · You have a new rash.    Watch closely for changes in your health, and be sure to contact your doctor if:    · You cough more deeply or more often, especially if you notice more mucus or a change in the color of your mucus.     · You are not getting better as expected. Where can you learn more? Go to https://[employer]. org and sign in to your [employer] account. Enter H333 in the Break30 box to learn more about \"Bronchitis: Care Instructions. \"     If you do not have an account, please click on the \"Sign Up Now\" link. Current as of: June 9, 2019  Content Version: 12.3  © 0539-4014 Healthwise, Incorporated. Care instructions adapted under license by [facility] ([facility]). If you have questions about a medical condition or this instruction, always ask your healthcare professional. [place] [address] any warranty or liability for your use of this information.

## 2020-02-26 ENCOUNTER — OFFICE VISIT (OUTPATIENT)
Dept: FAMILY MEDICINE CLINIC | Age: 50
End: 2020-02-26
Payer: COMMERCIAL

## 2020-02-26 VITALS
OXYGEN SATURATION: 97 % | TEMPERATURE: 97 F | BODY MASS INDEX: 28.49 KG/M2 | HEIGHT: 73 IN | WEIGHT: 215 LBS | HEART RATE: 84 BPM | SYSTOLIC BLOOD PRESSURE: 118 MMHG | DIASTOLIC BLOOD PRESSURE: 70 MMHG | RESPIRATION RATE: 18 BRPM

## 2020-02-26 PROCEDURE — G8484 FLU IMMUNIZE NO ADMIN: HCPCS | Performed by: PHYSICIAN ASSISTANT

## 2020-02-26 PROCEDURE — 69210 REMOVE IMPACTED EAR WAX UNI: CPT | Performed by: PHYSICIAN ASSISTANT

## 2020-02-26 PROCEDURE — G8427 DOCREV CUR MEDS BY ELIG CLIN: HCPCS | Performed by: PHYSICIAN ASSISTANT

## 2020-02-26 PROCEDURE — G8419 CALC BMI OUT NRM PARAM NOF/U: HCPCS | Performed by: PHYSICIAN ASSISTANT

## 2020-02-26 PROCEDURE — 1036F TOBACCO NON-USER: CPT | Performed by: PHYSICIAN ASSISTANT

## 2020-02-26 PROCEDURE — 99213 OFFICE O/P EST LOW 20 MIN: CPT | Performed by: PHYSICIAN ASSISTANT

## 2020-02-26 ASSESSMENT — ENCOUNTER SYMPTOMS
COUGH: 0
SINUS PRESSURE: 0

## 2020-02-27 NOTE — PROGRESS NOTES
Subjective     Virgen Maren Sr. 51 y.o. male presents 2/26/20 with   Chief Complaint   Patient presents with    Ear Fullness     C/o ear fullness 4x days        HPI  Pt c/o muffled hearing and ear wax buildup x 4 days. This is a recurrent problem. He denies ear pain, drainage, fever, or URI symptoms. No treatment at home so far. Reviewed thefollowing history:    Past Medical History:   Diagnosis Date    Allergic rhinitis     Mild anemia     Mild hyperlipidemia      Past Surgical History:   Procedure Laterality Date    WISDOM TOOTH EXTRACTION       Family History   Problem Relation Age of Onset    Emphysema Father        Allergies   Allergen Reactions    Pcn [Penicillins] Nausea And Vomiting       No current outpatient medications on file. No current facility-administered medications for this visit. Review of Systems   Constitutional: Negative for chills. HENT: Positive for hearing loss. Negative for congestion, ear discharge, ear pain and sinus pressure. Respiratory: Negative for cough. Objective    Vitals:    02/26/20 1857   BP: 118/70   Site: Right Upper Arm   Position: Sitting   Cuff Size: Large Adult   Pulse: 84   Resp: 18   Temp: 97 °F (36.1 °C)   TempSrc: Oral   SpO2: 97%   Weight: 215 lb (97.5 kg)   Height: 6' 1\" (1.854 m)       Physical Exam  Vitals signs reviewed. Constitutional:       Appearance: Normal appearance. HENT:      Head: Normocephalic and atraumatic. Right Ear: Tympanic membrane and ear canal normal. There is impacted cerumen. Left Ear: Tympanic membrane and ear canal normal. There is impacted cerumen. Nose: No congestion. Mouth/Throat:      Pharynx: No oropharyngeal exudate or posterior oropharyngeal erythema. Eyes:      Conjunctiva/sclera: Conjunctivae normal.   Cardiovascular:      Rate and Rhythm: Normal rate and regular rhythm. Pulmonary:      Effort: Pulmonary effort is normal.      Breath sounds: Normal breath sounds. Neurological:      Mental Status: He is alert. Assessment and Plan      ICD-10-CM    1. Bilateral impacted cerumen H61.23 02076 - [place] REMOVE IMPACTED EAR WAX       Orders Placed This Encounter   Procedures    24320 - [place] REMOVE IMPACTED EAR WAX     Both ear canals were irrigated with a 1:1 mixture of warm water and hydrogen peroxide. Soft brown wax was removed. The patient tolerated the procedure well without any adverse side effects. After the irrigation, both ear canals were clearly visualized by otoscope. No signs of infection were present in either ear canal or tympanic membrane. No orders of the defined types were placed in this encounter. Reviewed with the patient: current clinicalstatus, medications, activities and diet. Side effects, adverse effects of the medication prescribed today, as well as treatment plan and result expectations have been discussed with the patient who expressesunderstanding and desires to proceed. Close follow up to evaluate treatment results and for coordination of care. I have reviewed the patient's medical history in detail and updated the computerized patientrecord. Return if symptoms worsen or fail to improve, for follow up with PCP.     HARRIS Schwarz

## 2020-05-21 ENCOUNTER — VIRTUAL VISIT (OUTPATIENT)
Dept: FAMILY MEDICINE CLINIC | Age: 50
End: 2020-05-21
Payer: COMMERCIAL

## 2020-05-21 PROBLEM — H52.4 BILATERAL PRESBYOPIA: Status: ACTIVE | Noted: 2019-08-13

## 2020-05-21 PROCEDURE — 99213 OFFICE O/P EST LOW 20 MIN: CPT | Performed by: FAMILY MEDICINE

## 2020-05-21 RX ORDER — ASCORBIC ACID 500 MG
500 TABLET ORAL DAILY
COMMUNITY

## 2020-05-21 ASSESSMENT — ENCOUNTER SYMPTOMS
ABDOMINAL PAIN: 0
VOMITING: 0
SHORTNESS OF BREATH: 0
CONSTIPATION: 0
DIARRHEA: 0
NAUSEA: 0
CHEST TIGHTNESS: 0

## 2020-05-21 ASSESSMENT — PATIENT HEALTH QUESTIONNAIRE - PHQ9
SUM OF ALL RESPONSES TO PHQ9 QUESTIONS 1 & 2: 0
1. LITTLE INTEREST OR PLEASURE IN DOING THINGS: 0
SUM OF ALL RESPONSES TO PHQ QUESTIONS 1-9: 0
2. FEELING DOWN, DEPRESSED OR HOPELESS: 0
SUM OF ALL RESPONSES TO PHQ QUESTIONS 1-9: 0

## 2020-05-21 NOTE — PROGRESS NOTES
findings-     ASSESSMENT/PLAN:  1. Increased urinary frequency  Patient with 1 month history of urinary symptoms suspicious for prostate etiology. He denies any symptoms of illness which would be consistent with a prostatitis. Based on reported sensation of incomplete bladder emptying I will refer him to urology for digital rectal examination of the prostate and postvoid residual testing. We will obtain a urine sample to rule out any potential bladder infection. Patient was instructed to call the office should there be any change or progression to his symptoms. I offered him a course of tamsulosin empirically, but patient stated he would rather be evaluated by the specialist first    - Urinalysis; Future  - Culture, Urine; Future  - Leonel Lofton MD, Urology, [place]    2. Sensation of pressure in bladder area  See above    - Urinalysis; Future  - Culture, Urine; Lisandro  - Leonel Lofton MD, Urology, [place]    3. Feeling of incomplete bladder emptying  See above    - Urinalysis; Future  - Culture, Urine; Lisandro  - Leonel Lofton MD, [facility]      Return if symptoms worsen or fail to improve. Dennis Pearson is a 51 y.o. male being evaluated by a Virtual Visit (video visit) encounter to address concerns as mentioned above. A caregiver was present when appropriate. Due to this being a TeleHealth encounter (During [facility]-20 public health emergency), evaluation of the following organ systems was limited: Vitals/Constitutional/EENT/Resp/CV/GI//MS/Neuro/Skin/Heme-Lymph-Imm. Pursuant to the emergency declaration under the [address] [place] [address], [address] [place] [address] authority and the [facility] and [employer] Act, this Virtual Visit was conducted with patient's (and/or legal guardian's) consent, to reduce the patient's risk of exposure to COVID-19 and provide necessary medical care.   The patient (and/or legal guardian) has also been advised to contact this office for worsening conditions or problems, and seek emergency medical treatment and/or call 911 if deemed necessary. Patient identification was verified at the start of the visit: Yes    Patient location: home  Provider location: office    Total time spent on this encounter: Not billed by time    Services were provided through a video synchronous discussion virtually to substitute for in-person clinic visit. Patient and provider were located at their individual homes. --Alison Paz MD on 5/21/2020 at 5:33 PM    An electronic signature was used to authenticate this note.

## 2020-05-22 ENCOUNTER — HOSPITAL ENCOUNTER (OUTPATIENT)
Dept: LAB | Age: 50
Discharge: HOME | End: 2020-05-22
Payer: COMMERCIAL

## 2020-05-22 ENCOUNTER — TELEPHONE (OUTPATIENT)
Dept: FAMILY MEDICINE CLINIC | Age: 50
End: 2020-05-22

## 2020-05-22 LAB
BILIRUBIN URINE: NEGATIVE
BLOOD, URINE: NEGATIVE
CLARITY: CLEAR
COLOR: YELLOW
GLUCOSE URINE: NEGATIVE MG/DL
KETONES, URINE: ABNORMAL MG/DL
LEUKOCYTE ESTERASE, URINE: NEGATIVE
NITRITE, URINE: NEGATIVE
PH UA: 5.5 (ref 5–9)
PROTEIN UA: NEGATIVE MG/DL
SPECIFIC GRAVITY UA: 1.03 (ref 1–1.03)
UROBILINOGEN, URINE: 0.2 E.U./DL

## 2020-05-22 PROCEDURE — 81003 URINALYSIS AUTO W/O SCOPE: CPT

## 2020-05-22 PROCEDURE — 87086 URINE CULTURE/COLONY COUNT: CPT

## 2020-05-22 NOTE — TELEPHONE ENCOUNTER
Called patient let him know that he has an appt with Dr Shane Goodman at [facility] on May 29 in [facility]. cp

## 2020-05-24 LAB — URINE CULTURE, ROUTINE: NORMAL

## 2020-05-29 ENCOUNTER — OFFICE VISIT (OUTPATIENT)
Dept: UROLOGY | Age: 50
End: 2020-05-29
Payer: COMMERCIAL

## 2020-05-29 VITALS
WEIGHT: 210 LBS | HEIGHT: 73 IN | DIASTOLIC BLOOD PRESSURE: 70 MMHG | HEART RATE: 78 BPM | SYSTOLIC BLOOD PRESSURE: 110 MMHG | BODY MASS INDEX: 27.83 KG/M2

## 2020-05-29 LAB
BILIRUBIN, POC: NORMAL
BLOOD URINE, POC: NORMAL
CLARITY, POC: CLEAR
COLOR, POC: YELLOW
GLUCOSE URINE, POC: NORMAL
KETONES, POC: NORMAL
LEUKOCYTE EST, POC: NORMAL
NITRITE, POC: NORMAL
PH, POC: 7
POST VOID RESIDUAL (PVR): 45 ML
PROTEIN, POC: NORMAL
SPECIFIC GRAVITY, POC: 1.01
UROBILINOGEN, POC: 0.2

## 2020-05-29 PROCEDURE — 99242 OFF/OP CONSLTJ NEW/EST SF 20: CPT | Performed by: UROLOGY

## 2020-05-29 PROCEDURE — G8419 CALC BMI OUT NRM PARAM NOF/U: HCPCS | Performed by: UROLOGY

## 2020-05-29 PROCEDURE — 81003 URINALYSIS AUTO W/O SCOPE: CPT | Performed by: UROLOGY

## 2020-05-29 PROCEDURE — 51798 US URINE CAPACITY MEASURE: CPT | Performed by: UROLOGY

## 2020-05-29 PROCEDURE — G8427 DOCREV CUR MEDS BY ELIG CLIN: HCPCS | Performed by: UROLOGY

## 2020-05-29 RX ORDER — M-VIT,TX,IRON,MINS/CALC/FOLIC 27MG-0.4MG
1 TABLET ORAL DAILY
COMMUNITY

## 2020-05-29 NOTE — PROGRESS NOTES
[facility] EVALUATION NOTE                                                 H&P                                                                                                                                                 Reason for Visit  Hesitancy of urination    History of Present Illness  50-year-old male with no family history of prostate issues  Mild hesitancy  Occasional nocturia more so recently  Denies UTIs  Sexually active with good erections  Denies incontinence or hematuria      Urologic Review of Systems/Symptoms  Denies hematuria  Denies dysuria  Denies incontinence  Denies flank pain  Other Urologic: Hesitancy is the major issue    Review of Systems  Head and neck: No issues/reviewed  Cardiac: No recent issues/reviewed  Pulmonary: No issues/reviewed  Gastrointestinal: No issues/reviewed  Neurologic: No recent issues/reviewed  Extremities: No issues/reviewed  Lymphatics: No lymphadenopathy no change  Genitourinary: See above  Skin: No issues/reviewed  Hospitalization: None recent  Meds reviewed  All 14 categories of Review of Systems otherwise reviewed no other findings reported.     Past Medical History:   Diagnosis Date    Allergic rhinitis     Mild anemia     Mild hyperlipidemia      Past Surgical History:   Procedure Laterality Date    WISDOM TOOTH EXTRACTION       Social History     Socioeconomic History    Marital status:      Spouse name: None    Number of children: None    Years of education: None    Highest education level: None   Occupational History    None   Social Needs    Financial resource strain: None    Food insecurity     Worry: None     Inability: None    Transportation needs     Medical: None     Non-medical: None   Tobacco Use    Smoking status: Never Smoker    Smokeless tobacco: Never Used   Substance and Sexual Activity    Alcohol use: No    Drug use: No    Sexual activity: Yes     Partners: Female   Lifestyle    Physical activity     Days per

## 2020-08-04 ENCOUNTER — VIRTUAL VISIT (OUTPATIENT)
Dept: FAMILY MEDICINE CLINIC | Age: 50
End: 2020-08-04
Payer: COMMERCIAL

## 2020-08-04 ENCOUNTER — HOSPITAL ENCOUNTER (OUTPATIENT)
Dept: LAB | Age: 50
Discharge: HOME | End: 2020-08-04
Payer: COMMERCIAL

## 2020-08-04 LAB
ALBUMIN SERPL-MCNC: 4.2 G/DL (ref 3.5–4.6)
ALP BLD-CCNC: 59 U/L (ref 35–104)
ALT SERPL-CCNC: 29 U/L (ref 0–41)
ANION GAP SERPL CALCULATED.3IONS-SCNC: 12 MEQ/L (ref 9–15)
AST SERPL-CCNC: 24 U/L (ref 0–40)
BASOPHILS ABSOLUTE: 0 K/UL (ref 0–0.2)
BASOPHILS RELATIVE PERCENT: 0.3 %
BILIRUB SERPL-MCNC: 0.7 MG/DL (ref 0.2–0.7)
BUN BLDV-MCNC: 16 MG/DL (ref 6–20)
CALCIUM SERPL-MCNC: 9.3 MG/DL (ref 8.5–9.9)
CHLORIDE BLD-SCNC: 101 MEQ/L (ref 95–107)
CHOLESTEROL, TOTAL: 175 MG/DL (ref 0–199)
CO2: 27 MEQ/L (ref 20–31)
CREAT SERPL-MCNC: 1.13 MG/DL (ref 0.7–1.2)
EOSINOPHILS ABSOLUTE: 0.1 K/UL (ref 0–0.7)
EOSINOPHILS RELATIVE PERCENT: 1.4 %
GFR AFRICAN AMERICAN: >60
GFR NON-AFRICAN AMERICAN: >60
GLOBULIN: 3.2 G/DL (ref 2.3–3.5)
GLUCOSE BLD-MCNC: 88 MG/DL (ref 70–99)
HBA1C MFR BLD: 5 % (ref 4.8–5.9)
HCT VFR BLD CALC: 42.7 % (ref 42–52)
HDLC SERPL-MCNC: 58 MG/DL (ref 40–59)
HEMOGLOBIN: 14.1 G/DL (ref 14–18)
LDL CHOLESTEROL CALCULATED: 105 MG/DL (ref 0–129)
LYMPHOCYTES ABSOLUTE: 1.7 K/UL (ref 1–4.8)
LYMPHOCYTES RELATIVE PERCENT: 40.6 %
MCH RBC QN AUTO: 33 PG (ref 27–31.3)
MCHC RBC AUTO-ENTMCNC: 33.1 % (ref 33–37)
MCV RBC AUTO: 99.7 FL (ref 80–100)
MONOCYTES ABSOLUTE: 0.4 K/UL (ref 0.2–0.8)
MONOCYTES RELATIVE PERCENT: 9.7 %
NEUTROPHILS ABSOLUTE: 2 K/UL (ref 1.4–6.5)
NEUTROPHILS RELATIVE PERCENT: 48 %
PDW BLD-RTO: 13.1 % (ref 11.5–14.5)
PLATELET # BLD: 269 K/UL (ref 130–400)
POTASSIUM SERPL-SCNC: 4.3 MEQ/L (ref 3.4–4.9)
PROSTATE SPECIFIC ANTIGEN: 0.77 NG/ML
RBC # BLD: 4.29 M/UL (ref 4.7–6.1)
SODIUM BLD-SCNC: 140 MEQ/L (ref 135–144)
TOTAL PROTEIN: 7.4 G/DL (ref 6.3–8)
TRIGL SERPL-MCNC: 58 MG/DL (ref 0–150)
WBC # BLD: 4.2 K/UL (ref 4.8–10.8)

## 2020-08-04 PROCEDURE — 80053 COMPREHEN METABOLIC PANEL: CPT

## 2020-08-04 PROCEDURE — 85025 COMPLETE CBC W/AUTO DIFF WBC: CPT

## 2020-08-04 PROCEDURE — 84153 ASSAY OF PSA TOTAL: CPT

## 2020-08-04 PROCEDURE — 36415 COLL VENOUS BLD VENIPUNCTURE: CPT

## 2020-08-04 PROCEDURE — 80061 LIPID PANEL: CPT

## 2020-08-04 PROCEDURE — G8427 DOCREV CUR MEDS BY ELIG CLIN: HCPCS | Performed by: FAMILY MEDICINE

## 2020-08-04 PROCEDURE — 83036 HEMOGLOBIN GLYCOSYLATED A1C: CPT

## 2020-08-04 PROCEDURE — 99213 OFFICE O/P EST LOW 20 MIN: CPT | Performed by: FAMILY MEDICINE

## 2020-08-04 NOTE — LETTER
[address] [facility]  [phone]    August 10, 2020     [address] [place] [address] Road      Dear Dk Lynn: We have tried calling you 3 times and did not get in touch with you. Below are the results from your recent visit:    Resulted Orders   PSA, Diagnostic   Result Value Ref Range    PSA 0.77 ng/mL     The test results show that your current treatment is working. Please review the attached information. If you have any questions or concerns, please don't hesitate to call.     Sincerely,        Sharla Miner

## 2020-08-05 ASSESSMENT — ENCOUNTER SYMPTOMS
APNEA: 0
EYE REDNESS: 0
TROUBLE SWALLOWING: 0
SHORTNESS OF BREATH: 0
CONSTIPATION: 0
EYE ITCHING: 0
CHOKING: 0
RHINORRHEA: 0
BACK PAIN: 0
ABDOMINAL DISTENTION: 0
ANAL BLEEDING: 0
SORE THROAT: 0
BLOOD IN STOOL: 0
NAUSEA: 0
COUGH: 0
EYE PAIN: 0
VOICE CHANGE: 0
DIARRHEA: 0
PHOTOPHOBIA: 0
FACIAL SWELLING: 0
SINUS PRESSURE: 0
ABDOMINAL PAIN: 0
EYE DISCHARGE: 0
CHEST TIGHTNESS: 0
WHEEZING: 0

## 2020-08-05 NOTE — PROGRESS NOTES
Genitourinary: Negative for difficulty urinating, discharge, dysuria, flank pain, frequency, genital sores, hematuria, penile pain, scrotal swelling, testicular pain and urgency. Musculoskeletal: Negative for arthralgias, back pain, gait problem, joint swelling, myalgias and neck pain. Skin: Negative for rash. Neurological: Negative for dizziness, seizures, syncope, weakness, light-headedness, numbness and headaches. Hematological: Negative for adenopathy. Psychiatric/Behavioral: Negative for behavioral problems, confusion, self-injury and suicidal ideas. The patient is not nervous/anxious and is not hyperactive. Prior to Visit Medications    Medication Sig Taking? Authorizing Provider   Multiple Vitamins-Minerals (THERAPEUTIC MULTIVITAMIN-MINERALS) tablet Take 1 tablet by mouth daily Yes Historical Provider, MD   vitamin C (ASCORBIC ACID) 500 MG tablet Take 500 mg by mouth daily Yes Historical Provider, MD       Social History     Tobacco Use    Smoking status: Never Smoker    Smokeless tobacco: Never Used   Substance Use Topics    Alcohol use: No    Drug use: No          PHYSICAL EXAMINATION:    [x] Alert  [x] Oriented to person/place/time    [x] No apparent distress   X Sclera clear [x] Breathing appears normal     [x] Cranial Nerves II-XII grossly intact    [x] Motor grossly intact in visible upper extremities    [x] Motor grossly intact in visible lower extremities    [x] Normal Mood [] OTHER:      Due to this being a TeleHealth encounter, evaluation of the following organ systems is limited: Vitals/Constitutional/EENT/Resp/CV/GI//MS/Neuro/Skin/Heme-Lymph-Imm. ASSESSMENT/PLAN:  1. Leukopenia, unspecified type  Chronic, but Unclear etiology. will have patient evaluated by hematology  - Amb External Referral To Oncology      No follow-ups on file.     An  electronic signature was used to authenticate this note.    --Paz Bro MD on 8/5/2020 at 9:15 AM    [id number]}    Pursuant to the emergency declaration under the [facility] waiver authority and the [employer] Act, this Virtual  Visit was conducted, with patient's consent, to reduce the patient's risk of exposure to COVID-19 and provide continuity of care for an established patient. Services were provided through a video synchronous discussion virtually to substitute for in-person clinic visit.

## 2020-10-19 ENCOUNTER — OFFICE VISIT (OUTPATIENT)
Dept: FAMILY MEDICINE CLINIC | Age: 50
End: 2020-10-19
Payer: COMMERCIAL

## 2020-10-19 VITALS
SYSTOLIC BLOOD PRESSURE: 116 MMHG | HEART RATE: 81 BPM | TEMPERATURE: 97 F | RESPIRATION RATE: 12 BRPM | BODY MASS INDEX: 27.83 KG/M2 | WEIGHT: 210 LBS | HEIGHT: 73 IN | OXYGEN SATURATION: 98 % | DIASTOLIC BLOOD PRESSURE: 80 MMHG

## 2020-10-19 PROCEDURE — G8427 DOCREV CUR MEDS BY ELIG CLIN: HCPCS | Performed by: PHYSICIAN ASSISTANT

## 2020-10-19 PROCEDURE — G8419 CALC BMI OUT NRM PARAM NOF/U: HCPCS | Performed by: PHYSICIAN ASSISTANT

## 2020-10-19 PROCEDURE — 90686 IIV4 VACC NO PRSV 0.5 ML IM: CPT | Performed by: PHYSICIAN ASSISTANT

## 2020-10-19 PROCEDURE — G8482 FLU IMMUNIZE ORDER/ADMIN: HCPCS | Performed by: PHYSICIAN ASSISTANT

## 2020-10-19 PROCEDURE — 69210 REMOVE IMPACTED EAR WAX UNI: CPT | Performed by: PHYSICIAN ASSISTANT

## 2020-10-19 PROCEDURE — 1036F TOBACCO NON-USER: CPT | Performed by: PHYSICIAN ASSISTANT

## 2020-10-19 PROCEDURE — 90471 IMMUNIZATION ADMIN: CPT | Performed by: PHYSICIAN ASSISTANT

## 2020-10-19 PROCEDURE — 99213 OFFICE O/P EST LOW 20 MIN: CPT | Performed by: PHYSICIAN ASSISTANT

## 2020-10-19 ASSESSMENT — ENCOUNTER SYMPTOMS
SORE THROAT: 0
COUGH: 0

## 2020-10-19 NOTE — PATIENT INSTRUCTIONS
Patient Education        Earwax Blockage: Care Instructions  Your Care Instructions     Earwax is a natural substance that protects the ear canal. Normally, earwax drains from the ears and does not cause problems. Sometimes earwax builds up and hardens. Earwax blockage (also called cerumen impaction) can cause some loss of hearing and pain. When wax is tightly packed, you will need to have your doctor remove it. Follow-up care is a key part of your treatment and safety. Be sure to make and go to all appointments, and call your doctor if you are having problems. It's also a good idea to know your test results and keep a list of the medicines you take. How can you care for yourself at home? · Do not try to remove earwax with cotton swabs, fingers, or other objects. This can make the blockage worse and damage the eardrum. · If your doctor recommends that you try to remove earwax at home:  ? Soften and loosen the earwax with warm mineral oil. You also can try hydrogen peroxide mixed with an equal amount of room temperature water. Place 2 drops of the fluid, warmed to body temperature, in the ear two times a day for up to 5 days. ? Once the wax is loose and soft, all that is usually needed to remove it from the ear canal is a gentle, warm shower. Direct the water into the ear, then tip your head to let the earwax drain out. Dry your ear thoroughly with a hair dryer set on low. Hold the dryer several inches from your ear. ? If the warm mineral oil and shower do not work, use an over-the-counter wax softener. Read and follow all instructions on the label. After using the wax softener, use an ear syringe to gently flush the ear. Make sure the flushing solution is body temperature. Cool or hot fluids in the ear can cause dizziness. When should you call for help?    Call your doctor now or seek immediate medical care if:    · Pus or blood drains from your ear.     · Your ears are ringing or feel full.     · You have a loss of hearing. Watch closely for changes in your health, and be sure to contact your doctor if:    · You have pain or reduced hearing after 1 week of home treatment.     · You have any new symptoms, such as nausea or balance problems. Where can you learn more? Go to https://chperetaeweb.Fuze. org and sign in to your [employer] Pictures / [employer] account. Enter B443 in the [employer]Runner box to learn more about \"Earwax Blockage: Care Instructions. \"     If you do not have an account, please click on the \"Sign Up Now\" link. Current as of: June 26, 2019               Content Version: 12.6  © 9228-9815 [employer], [employer]orporated. Care instructions adapted under license by [facility]. If you have questions about a medical condition or this instruction, always ask your healthcare professional. [address] any warranty or liability for your use of this information.

## 2020-10-19 NOTE — PROGRESS NOTES
Vaccine Information Sheet, \"Influenza - Inactivated\"  given to Zhou Ng., or parent/legal guardian of  Zhou Ng. and verbalized understanding. Patient responses:    Have you ever had a reaction to a flu vaccine? No  Are you able to eat eggs without adverse effects? Yes  Do you have any current illness? No  Have you ever had Guillian [place] Syndrome? No    Flu vaccine given per order. Please see immunization tab.
is impacted cerumen. Nose: Nose normal.      Mouth/Throat:      Pharynx: No posterior oropharyngeal erythema. Eyes:      Conjunctiva/sclera: Conjunctivae normal.   Cardiovascular:      Rate and Rhythm: Normal rate and regular rhythm. Pulmonary:      Effort: Pulmonary effort is normal.      Breath sounds: Normal breath sounds. Neurological:      Mental Status: He is alert. Assessment and Plan      ICD-10-CM    1. Bilateral impacted cerumen  H61.23 46377 - [place] REMOVE IMPACTED EAR WAX   2. Need for influenza vaccination  Z23 INFLUENZA, QUADV, 3 YRS AND OLDER, IM PF, PREFILL SYR OR SDV, 0.5ML (AFLURIA QUADV, PF)       Orders Placed This Encounter   Procedures    INFLUENZA, QUADV, 3 YRS AND OLDER, IM PF, PREFILL SYR OR SDV, 0.5ML (AFLURIA QUADV, PF)    10310 - [place] REMOVE IMPACTED EAR WAX     Both ear canals were irrigated with a 1:1 mixture of warm water and hydrogen peroxide. Soft brown wax was removed. The patient tolerated the procedure well without any adverse side effects. After the irrigation, both ear canals were clearly visualized by otoscope. No signs of infection were present in either ear canal or tympanic membrane. No orders of the defined types were placed in this encounter. Reviewed with the patient: current clinicalstatus, medications, activities and diet. Side effects, adverse effects of the medication prescribed today, as well as treatment plan and result expectations have been discussed with the patient who expressesunderstanding and desires to proceed. Close follow up to evaluate treatment results and for coordination of care. I have reviewed the patient's medical history in detail and updated the computerized patientrecord. Return if symptoms worsen or fail to improve, for follow up with PCP.     HARRIS Ying

## 2020-12-24 ENCOUNTER — OFFICE VISIT (OUTPATIENT)
Dept: FAMILY MEDICINE CLINIC | Age: 50
End: 2020-12-24
Payer: COMMERCIAL

## 2020-12-24 VITALS
DIASTOLIC BLOOD PRESSURE: 70 MMHG | BODY MASS INDEX: 28.76 KG/M2 | HEART RATE: 65 BPM | OXYGEN SATURATION: 97 % | WEIGHT: 218 LBS | TEMPERATURE: 97.5 F | SYSTOLIC BLOOD PRESSURE: 108 MMHG

## 2020-12-24 PROCEDURE — G8482 FLU IMMUNIZE ORDER/ADMIN: HCPCS | Performed by: NURSE PRACTITIONER

## 2020-12-24 PROCEDURE — G8427 DOCREV CUR MEDS BY ELIG CLIN: HCPCS | Performed by: NURSE PRACTITIONER

## 2020-12-24 PROCEDURE — G8419 CALC BMI OUT NRM PARAM NOF/U: HCPCS | Performed by: NURSE PRACTITIONER

## 2020-12-24 PROCEDURE — 99213 OFFICE O/P EST LOW 20 MIN: CPT | Performed by: NURSE PRACTITIONER

## 2020-12-24 PROCEDURE — 69210 REMOVE IMPACTED EAR WAX UNI: CPT | Performed by: NURSE PRACTITIONER

## 2020-12-24 PROCEDURE — 1036F TOBACCO NON-USER: CPT | Performed by: NURSE PRACTITIONER

## 2020-12-24 ASSESSMENT — ENCOUNTER SYMPTOMS
SORE THROAT: 0
SINUS PAIN: 0
RHINORRHEA: 0
SHORTNESS OF BREATH: 0
COUGH: 0
WHEEZING: 0
SINUS PRESSURE: 0

## 2020-12-24 NOTE — PATIENT INSTRUCTIONS
Patient Education        Earwax Blockage: Care Instructions  Your Care Instructions     Earwax is a natural substance that protects the ear canal. Normally, earwax drains from the ears and does not cause problems. Sometimes earwax builds up and hardens. Earwax blockage (also called cerumen impaction) can cause some loss of hearing and pain. When wax is tightly packed, you will need to have your doctor remove it. Follow-up care is a key part of your treatment and safety. Be sure to make and go to all appointments, and call your doctor if you are having problems. It's also a good idea to know your test results and keep a list of the medicines you take. How can you care for yourself at home? · Do not try to remove earwax with cotton swabs, fingers, or other objects. This can make the blockage worse and damage the eardrum. · If your doctor recommends that you try to remove earwax at home:  ? Soften and loosen the earwax with warm mineral oil. You also can try hydrogen peroxide mixed with an equal amount of room temperature water. Place 2 drops of the fluid, warmed to body temperature, in the ear two times a day for up to 5 days. ? Once the wax is loose and soft, all that is usually needed to remove it from the ear canal is a gentle, warm shower. Direct the water into the ear, then tip your head to let the earwax drain out. Dry your ear thoroughly with a hair dryer set on low. Hold the dryer several inches from your ear. ? If the warm mineral oil and shower do not work, use an over-the-counter wax softener. Read and follow all instructions on the label. After using the wax softener, use an ear syringe to gently flush the ear. Make sure the flushing solution is body temperature. Cool or hot fluids in the ear can cause dizziness. When should you call for help? Call your doctor now or seek immediate medical care if:    · Pus or blood drains from your ear.     · Your ears are ringing or feel full.   · You have a loss of hearing. Watch closely for changes in your health, and be sure to contact your doctor if:    · You have pain or reduced hearing after 1 week of home treatment.     · You have any new symptoms, such as nausea or balance problems. Where can you learn more? Go to https://chpepiceweb.Modern Message. org and sign in to your Doctor kinetic account. Enter S176 in the [employer] box to learn more about \"Earwax Blockage: Care Instructions. \"     If you do not have an account, please click on the \"Sign Up Now\" link. Current as of: June 26, 2019               Content Version: 12.6  © 5297-8173 [employer]ByMobile, Incorporated. Care instructions adapted under license by [facility] ([facility]). If you have questions about a medical condition or this instruction, always ask your healthcare professional. [address] any warranty or liability for your use of this information.

## 2020-12-24 NOTE — PROGRESS NOTES
Subjective:      Patient ID: Franklyn Ward is a 53 y.o. male who presents today for:  Chief Complaint   Patient presents with    Cerumen Impaction     plugged ear sensation       Ear Fullness   Affected ear: both. This is a new problem. The current episode started in the past 7 days. The problem occurs constantly. The problem has been unchanged. There has been no fever. The patient is experiencing no pain. Associated symptoms include hearing loss (\"plugged sensation\"). Pertinent negatives include no coughing, ear discharge, headaches, rhinorrhea or sore throat. He has tried nothing for the symptoms. There is no history of a chronic ear infection, hearing loss or a tympanostomy tube. Past Medical History:   Diagnosis Date    Allergic rhinitis     Mild anemia     Mild hyperlipidemia      Past Surgical History:   Procedure Laterality Date    WISDOM TOOTH EXTRACTION       Family History   Problem Relation Age of Onset    Emphysema Father      Allergies   Allergen Reactions    Pcn [Penicillins] Nausea And Vomiting         Review of Systems   Constitutional: Negative for chills, fatigue and fever. HENT: Positive for hearing loss (\"plugged sensation\"). Negative for congestion, ear discharge, ear pain, postnasal drip, rhinorrhea, sinus pressure, sinus pain and sore throat. Respiratory: Negative for cough, shortness of breath and wheezing. Cardiovascular: Negative for chest pain. Neurological: Negative for headaches. Objective:   /70   Pulse 65   Temp 97.5 °F (36.4 °C)   Wt 218 lb (98.9 kg)   SpO2 97%   BMI 28.76 kg/m²     Physical Exam  Vitals signs reviewed. Constitutional:       General: He is not in acute distress. Appearance: He is well-developed. He is not ill-appearing. HENT:      Head: Normocephalic. Right Ear: Ear canal and external ear normal. There is impacted cerumen. Left Ear: Ear canal and external ear normal. There is impacted cerumen.       Ears: Comments: Right Ear: right TM could not see  Left Ear: left TM could not see  After removal: normal bilaterally  Neck:      Musculoskeletal: Normal range of motion. Cardiovascular:      Rate and Rhythm: Normal rate and regular rhythm. Heart sounds: Normal heart sounds. Pulmonary:      Effort: Pulmonary effort is normal.      Breath sounds: Normal breath sounds. Musculoskeletal: Normal range of motion. Skin:     General: Skin is warm and dry. Neurological:      Mental Status: He is alert and oriented to person, place, and time. Assessment:       Diagnosis Orders   1. Bilateral impacted cerumen  04050 - [place] REMOVE IMPACTED EAR WAX         Plan:      Orders Placed This Encounter   Procedures    50780 - [place] REMOVE IMPACTED EAR WAX     Bilateral cerumen removed by irrigation. Patient tolerated well. No signs of infection. Reviewed supportive measures for symptom management. Treatment plan/rationale, follow-up care, and result expectations have been discussed with the patient. Discussed signs and symptoms which require immediate follow-up in ED. Patient verbalizes understanding. I have reviewed and updated the electronic medical record. Return if symptoms worsen or fail to improve, for follow up with PCP.       MIGEL Guajardo - NP

## 2021-01-19 ENCOUNTER — OFFICE VISIT (OUTPATIENT)
Dept: FAMILY MEDICINE CLINIC | Age: 51
End: 2021-01-19
Payer: COMMERCIAL

## 2021-01-19 VITALS
BODY MASS INDEX: 28.89 KG/M2 | WEIGHT: 218 LBS | HEIGHT: 73 IN | HEART RATE: 70 BPM | TEMPERATURE: 97 F | SYSTOLIC BLOOD PRESSURE: 122 MMHG | OXYGEN SATURATION: 98 % | RESPIRATION RATE: 12 BRPM | DIASTOLIC BLOOD PRESSURE: 76 MMHG

## 2021-01-19 DIAGNOSIS — H61.23 BILATERAL IMPACTED CERUMEN: Primary | ICD-10-CM

## 2021-01-19 DIAGNOSIS — H60.93 INFLAMMATION OF EAR CANAL, BILATERAL: ICD-10-CM

## 2021-01-19 PROCEDURE — 4130F TOPICAL PREP RX AOE: CPT | Performed by: NURSE PRACTITIONER

## 2021-01-19 PROCEDURE — G8482 FLU IMMUNIZE ORDER/ADMIN: HCPCS | Performed by: NURSE PRACTITIONER

## 2021-01-19 PROCEDURE — G8419 CALC BMI OUT NRM PARAM NOF/U: HCPCS | Performed by: NURSE PRACTITIONER

## 2021-01-19 PROCEDURE — 1036F TOBACCO NON-USER: CPT | Performed by: NURSE PRACTITIONER

## 2021-01-19 PROCEDURE — G8427 DOCREV CUR MEDS BY ELIG CLIN: HCPCS | Performed by: NURSE PRACTITIONER

## 2021-01-19 PROCEDURE — 99212 OFFICE O/P EST SF 10 MIN: CPT | Performed by: NURSE PRACTITIONER

## 2021-01-19 RX ORDER — ACETIC ACID 20.65 MG/ML
2-3 SOLUTION AURICULAR (OTIC) 2 TIMES DAILY
Qty: 1 BOTTLE | Refills: 0 | Status: SHIPPED | OUTPATIENT
Start: 2021-01-19 | End: 2021-01-26

## 2021-01-19 ASSESSMENT — PATIENT HEALTH QUESTIONNAIRE - PHQ9
SUM OF ALL RESPONSES TO PHQ QUESTIONS 1-9: 0
SUM OF ALL RESPONSES TO PHQ9 QUESTIONS 1 & 2: 0
1. LITTLE INTEREST OR PLEASURE IN DOING THINGS: 0
SUM OF ALL RESPONSES TO PHQ QUESTIONS 1-9: 0
2. FEELING DOWN, DEPRESSED OR HOPELESS: 0
SUM OF ALL RESPONSES TO PHQ QUESTIONS 1-9: 0

## 2021-01-19 ASSESSMENT — ENCOUNTER SYMPTOMS
SORE THROAT: 0
COUGH: 0
SHORTNESS OF BREATH: 0

## 2021-01-19 NOTE — PROGRESS NOTES
Subjective:     Jennifer Ramon ., 53 y.o. male, presents to [facility] with:  Chief Complaint   Patient presents with    Ear Fullness     Cerumen Impaction  Patient is here with complaint of a plugged ear - L > R  Onset: a few days ago. There is a prior history of cerumen impaction- in October and December   Zina Keating denies ear pain. Patient's medications, allergies, past medical, surgical, social and family histories were reviewed and updated as appropriate. Review of Systems   Constitutional: Negative for chills and fever. HENT: Positive for ear pain and hearing loss (mild, muffled ). Negative for ear discharge, sore throat and tinnitus. Respiratory: Negative for cough and shortness of breath. Gastrointestinal: Negative for nausea. Musculoskeletal: Negative for neck pain. Neurological: Negative for dizziness and headaches. Objective:      Vitals:    01/19/21 1519   BP: 122/76   Site: Left Upper Arm   Position: Sitting   Cuff Size: Small Adult   Pulse: 70   Resp: 12   Temp: 97 °F (36.1 °C)   TempSrc: Temporal   SpO2: 98%   Weight: 218 lb (98.9 kg)   Height: 6' 1\" (1.854 m)     Physical Exam  General: alert, appears stated age, cooperative, and no distress   Right Ear: right TM could not see and right canal ceruminous, ceruminous: cleaned with curette and ceruminous: irrigated   Left Ear:  left TM could not see and left canal ceruminous: irrigated   After removal: external auditory canal with mild inflammation bilaterally and TMs intact, bilaterally   Vital signs reviewed. Assessment/ Plan:   53 y.o. male presenting with plugged ears. Ceruminosis is noted. Wax is removed by syringing and manual debridement. Ear drops prescribed. Instructions for home care to prevent wax buildup given. Diagnoses and all orders for this visit:    ICD-10-CM    1. Bilateral impacted cerumen  H61.23    2.  Inflammation of ear canal, bilateral  H60.93 acetic acid (VOSOL) 2 % otic solution Orders Placed This Encounter   Medications    acetic acid (VOSOL) 2 % otic solution     Sig: Place 2-3 drops into both ears 2 times daily for 7 days     Dispense:  1 Bottle     Refill:  0   Ear drops for inflammatory relief/ infection control prescribed. Discussed water exclusion from affected ear(s), symptomatic treatment, and return precautions. Return or see PCP if no improvement or worsening in 2-4 days -> hearing loss, ear discharge, HA, fever, vomiting, any other concerning sx   Handout provided    Ear Cerumen Removal Procedure Note  Indication: ear cerumen impaction and unable to visualize tympanic membrane  Procedure: After placing the patient's head in the appropriate position, the patient's right ear canal was gently irrigated with the appropriate solution + curetted using a soft plastic curette until the majority of the cerumen was flushed out of the canal. The remaining cerumen was removed by curette. The other ear canal also had cerumen present and was irrigated with the appropriate solution until all cerumen was removed and the ear canal was clear. At this point the procedure was complete. Tympanic membranes are intact following the procedure. Auditory canals appear mildly inflamed. Instructions for home care to prevent wax buildup provided. The patient tolerated the procedure well. Complications: None    Side effects and adverse effects of any medication prescribed today, as well as treatment plan/rationale, follow-up care, and result expectations have been discussed with the patient. Zina Keating expresses understanding and wishes to proceed with the plan. Discussed signs and symptoms which require immediate follow-up in ED/call to 911. Understanding verbalized. I have reviewed and updated the electronic medical record.     Nadir Pruitt, APRN - CNP

## 2021-01-19 NOTE — PATIENT INSTRUCTIONS
1. Drops containing acetic acid were prescribed to help to dry the ear, prevent skin maceration, and re-acidify the ear canal- use as directed  2. Refrain from swimming or submerging the affected ear(s) for 5-7 days  3. No headphones/ ear plugs until symptoms are resolved   4. Consider using wax softening drops once a week    Return or see Primary Care Provider if symptoms do not improve or worsen in the following days --> hearing loss, discharge from your ear, headaches, fevers, vomiting, or any other concerning symptoms    Patient Education   Earwax Blockage: Care Instructions  Your Care Instructions     Earwax is a natural substance that protects the ear canal. Normally, earwax drains from the ears and does not cause problems. Sometimes earwax builds up and hardens. Earwax blockage (also called cerumen impaction) can cause some loss of hearing and pain. When wax is tightly packed, you will need to have your doctor remove it. Follow-up care is a key part of your treatment and safety. Be sure to make and go to all appointments, and call your doctor if you are having problems. It's also a good idea to know your test results and keep a list of the medicines you take. How can you care for yourself at home? · Do not try to remove earwax with cotton swabs, fingers, or other objects. This can make the blockage worse and damage the eardrum. · If your doctor recommends that you try to remove earwax at home:  ? Soften and loosen the earwax with warm mineral oil. You also can try hydrogen peroxide mixed with an equal amount of room temperature water. Place 2 drops of the fluid, warmed to body temperature, in the ear two times a day for up to 5 days. ? Once the wax is loose and soft, all that is usually needed to remove it from the ear canal is a gentle, warm shower. Direct the water into the ear, then tip your head to let the earwax drain out. Dry your ear thoroughly with a hair dryer set on low. Hold the dryer several inches from your ear. ? If the warm mineral oil and shower do not work, use an over-the-counter wax softener. Read and follow all instructions on the label. After using the wax softener, use an ear syringe to gently flush the ear. Make sure the flushing solution is body temperature. Cool or hot fluids in the ear can cause dizziness. When should you call for help? Call your doctor now or seek immediate medical care if:    · Pus or blood drains from your ear.     · Your ears are ringing or feel full.     · You have a loss of hearing. Watch closely for changes in your health, and be sure to contact your doctor if:    · You have pain or reduced hearing after 1 week of home treatment.     · You have any new symptoms, such as nausea or balance problems. Where can you learn more? Go to https://Healthcare Engagement SolutionspeReframed.tv.QuantRx Biomedical. org and sign in to your [employer] account. Enter E854 in the [facility] box to learn more about \"Earwax Blockage: Care Instructions. \"     If you do not have an account, please click on the \"Sign Up Now\" link. Current as of: June 26, 2019               Content Version: 12.6  © 6323-8585 [employer]ne[employer], Incorporated. Care instructions adapted under license by [facility] ([facility]). If you have questions about a medical condition or this instruction, always ask your healthcare professional. [place] [address] any warranty or liability for your use of this information.

## 2021-02-04 ASSESSMENT — ENCOUNTER SYMPTOMS: NAUSEA: 0

## 2021-03-01 ENCOUNTER — VIRTUAL VISIT (OUTPATIENT)
Dept: FAMILY MEDICINE CLINIC | Age: 51
End: 2021-03-01
Payer: COMMERCIAL

## 2021-03-01 DIAGNOSIS — B96.89 ACUTE BACTERIAL SINUSITIS: Primary | ICD-10-CM

## 2021-03-01 DIAGNOSIS — J01.90 ACUTE BACTERIAL SINUSITIS: Primary | ICD-10-CM

## 2021-03-01 PROCEDURE — G8427 DOCREV CUR MEDS BY ELIG CLIN: HCPCS | Performed by: FAMILY MEDICINE

## 2021-03-01 PROCEDURE — 99213 OFFICE O/P EST LOW 20 MIN: CPT | Performed by: FAMILY MEDICINE

## 2021-03-01 RX ORDER — DOXYCYCLINE HYCLATE 100 MG
100 TABLET ORAL 2 TIMES DAILY
Qty: 20 TABLET | Refills: 0 | Status: SHIPPED | OUTPATIENT
Start: 2021-03-01 | End: 2021-03-11

## 2021-03-01 ASSESSMENT — ENCOUNTER SYMPTOMS
EYE PAIN: 0
ABDOMINAL PAIN: 0
PHOTOPHOBIA: 0
APNEA: 0
TROUBLE SWALLOWING: 0
COUGH: 0
FACIAL SWELLING: 0
EYE ITCHING: 0
WHEEZING: 0
ABDOMINAL DISTENTION: 0
STRIDOR: 0
SINUS PAIN: 0
SHORTNESS OF BREATH: 0
EYE DISCHARGE: 0
CHEST TIGHTNESS: 0
VOICE CHANGE: 0
CHOKING: 0
SORE THROAT: 1
RHINORRHEA: 1
EYE REDNESS: 0
SINUS PRESSURE: 1

## 2021-03-01 NOTE — PROGRESS NOTES
3/1/2021    TELEHEALTH EVALUATION -- Audio/Visual (During CDBXK-69 public health emergency)    Due to Jackson 19 outbreak, patient's office visit was converted to a virtual visit. Patient was contacted and agreed to proceed with a virtual visit via [email or web address] me  The risks and benefits of converting to a virtual visit were discussed in light of the current infectious disease epidemic. Patient also understood that insurance coverage and co-pays are up to their individual insurance plans. HPI:    Jennifer Ramno Bud (:  1970) has requested an audio/video evaluation for the following concern(s):    Patient is a very pleasant 28-year-old male presents today due to persistent sinus congestion postnasal drip over the past 9 days. He denies any fever, chills, cough or fatigue. He has normal sense of taste and smell. 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 He states that drainage is typically clear but he has significant congestion and frontal and maxillary sinus discomfort. He has taken Mucinex cold and congestion with positive effect but it is not lasting. He tried Claritin for 4 days without significant improvement. He states that this typically occurs once per year with weather changes and has responded to antibiotics in the past    Review of Systems   Constitutional: Negative for activity change, appetite change, chills, diaphoresis, fatigue, fever and unexpected weight change. HENT: Positive for congestion, postnasal drip, rhinorrhea, sinus pressure and sore throat. Negative for dental problem, drooling, ear discharge, ear pain, facial swelling, hearing loss, mouth sores, nosebleeds, sinus pain, sneezing, tinnitus, trouble swallowing and voice change. Eyes: Negative for photophobia, pain, discharge, redness, itching and visual disturbance. Respiratory: Negative for apnea, cough, choking, chest tightness, shortness of breath, wheezing and stridor. Cardiovascular: Negative for chest pain, palpitations and leg swelling. Gastrointestinal: Negative for abdominal distention and abdominal pain. Neurological: Negative for dizziness, facial asymmetry, light-headedness, numbness and headaches. Psychiatric/Behavioral: Negative for self-injury and suicidal ideas. Prior to Visit Medications    Medication Sig Taking? Authorizing Provider   doxycycline hyclate (VIBRA-TABS) 100 MG tablet Take 1 tablet by mouth 2 times daily for 10 days Yes Fiona Xiong MD   Cholecalciferol (VITAMIN D3) 125 MCG (5000 UT) TABS Take by mouth  Historical Provider, MD   Multiple Vitamins-Minerals (THERAPEUTIC MULTIVITAMIN-MINERALS) tablet Take 1 tablet by mouth daily  Historical Provider, MD   vitamin C (ASCORBIC ACID) 500 MG tablet Take 500 mg by mouth daily  Historical Provider, MD       Social History     Tobacco Use    Smoking status: Never Smoker    Smokeless tobacco: Never Used   Substance Use Topics    Alcohol use: No    Drug use: No            PHYSICAL EXAMINATION:    [x] Alert  [x] Oriented to person/place/time    [x] No apparent distress  [x] Sclera clear   [x] Breathing appears normal   [x] Cranial Nerves II-XII grossly intact    [x] Motor grossly intact in visible upper extremities    [x] Motor grossly intact in visible lower extremities    [x] Normal Mood   Physical Exam  HENT:      Head:           [] OTHER:      Due to this being a TeleHealth encounter, evaluation of the following organ systems is limited: Vitals/Constitutional/EENT/Resp/CV/GI//MS/Neuro/Skin/Heme-Lymph-Imm. ASSESSMENT/PLAN:  1. Acute bacterial sinusitis  Advised patient to continue Claritin and use Mucinex as needed  Due to duration of over 7 days and progression, as well as prior success with antibiotic we will treat again with doxycycline and follow for improvement.   Patient to call in 3 days if no significant improvement - doxycycline hyclate (VIBRA-TABS) 100 MG tablet; Take 1 tablet by mouth 2 times daily for 10 days  Dispense: 20 tablet; Refill: 0      Return if symptoms worsen or fail to improve. An  electronic signature was used to authenticate this note.    --Marcel Capps MD on 3/1/2021 at 10:04 AM    [id number]}    Pursuant to the emergency declaration under the [address] [place] [address] waiver authority and the [employer] Act, this Virtual  Visit was conducted, with patient's consent, to reduce the patient's risk of exposure to COVID-19 and provide continuity of care for an established patient. Services were provided through a video synchronous discussion virtually to substitute for in-person clinic visit.

## 2021-04-05 ENCOUNTER — OFFICE VISIT (OUTPATIENT)
Dept: FAMILY MEDICINE CLINIC | Age: 51
End: 2021-04-05
Payer: COMMERCIAL

## 2021-04-05 VITALS
HEART RATE: 78 BPM | TEMPERATURE: 97.9 F | OXYGEN SATURATION: 98 % | DIASTOLIC BLOOD PRESSURE: 90 MMHG | SYSTOLIC BLOOD PRESSURE: 122 MMHG | RESPIRATION RATE: 14 BRPM

## 2021-04-05 DIAGNOSIS — H60.391 OTHER INFECTIVE ACUTE OTITIS EXTERNA OF RIGHT EAR: ICD-10-CM

## 2021-04-05 DIAGNOSIS — H61.23 BILATERAL IMPACTED CERUMEN: Primary | ICD-10-CM

## 2021-04-05 PROCEDURE — 69210 REMOVE IMPACTED EAR WAX UNI: CPT | Performed by: NURSE PRACTITIONER

## 2021-04-05 PROCEDURE — G8419 CALC BMI OUT NRM PARAM NOF/U: HCPCS | Performed by: NURSE PRACTITIONER

## 2021-04-05 PROCEDURE — 99213 OFFICE O/P EST LOW 20 MIN: CPT | Performed by: NURSE PRACTITIONER

## 2021-04-05 PROCEDURE — 4130F TOPICAL PREP RX AOE: CPT | Performed by: NURSE PRACTITIONER

## 2021-04-05 PROCEDURE — G8427 DOCREV CUR MEDS BY ELIG CLIN: HCPCS | Performed by: NURSE PRACTITIONER

## 2021-04-05 PROCEDURE — 1036F TOBACCO NON-USER: CPT | Performed by: NURSE PRACTITIONER

## 2021-04-05 RX ORDER — LORATADINE 10 MG/1
10 CAPSULE, LIQUID FILLED ORAL DAILY
COMMUNITY

## 2021-04-05 ASSESSMENT — ENCOUNTER SYMPTOMS
CONSTIPATION: 0
SORE THROAT: 0
EYE PAIN: 0
EYE ITCHING: 0
ABDOMINAL PAIN: 0
DIARRHEA: 0
WHEEZING: 0
COLOR CHANGE: 0
VOMITING: 0
COUGH: 0
SINUS PRESSURE: 0
SINUS PAIN: 0
EYE DISCHARGE: 0
SHORTNESS OF BREATH: 0

## 2021-04-05 ASSESSMENT — PATIENT HEALTH QUESTIONNAIRE - PHQ9
1. LITTLE INTEREST OR PLEASURE IN DOING THINGS: 0
SUM OF ALL RESPONSES TO PHQ QUESTIONS 1-9: 0
SUM OF ALL RESPONSES TO PHQ QUESTIONS 1-9: 0

## 2021-04-05 NOTE — PROGRESS NOTES
e    Subjective  [facility] 53 y.o. male presents today with:  Chief Complaint   Patient presents with    Cerumen Impaction       HPI  Patient here with c/o ears being clogged  Needs to be flushed every so often  Using OTC ear softer   No tinnitus, no ear pain, no drainage  No pain  Denies fevers, chills   Review of Systems   Constitutional: Negative for chills and fever. HENT: Negative for congestion, ear discharge, ear pain, sinus pressure, sinus pain, sore throat and tinnitus. Eyes: Negative for pain, discharge and itching. Respiratory: Negative for cough, shortness of breath and wheezing. Cardiovascular: Negative for chest pain and palpitations. Gastrointestinal: Negative for abdominal pain, constipation, diarrhea and vomiting. Skin: Negative for color change and wound.        Past Medical History:   Diagnosis Date    Allergic rhinitis     Mild anemia     Mild hyperlipidemia      Past Surgical History:   Procedure Laterality Date    WISDOM TOOTH EXTRACTION       Social History     Socioeconomic History    Marital status:      Spouse name: Not on file    Number of children: Not on file    Years of education: Not on file    Highest education level: Not on file   Occupational History    Not on file   Social Needs    Financial resource strain: Not on file    Food insecurity     Worry: Not on file     Inability: Not on file    Transportation needs     Medical: Not on file     Non-medical: Not on file   Tobacco Use    Smoking status: Never Smoker    Smokeless tobacco: Never Used   Substance and Sexual Activity    Alcohol use: No    Drug use: No    Sexual activity: Yes     Partners: Female   Lifestyle    Physical activity     Days per week: Not on file     Minutes per session: Not on file    Stress: Not on file   Relationships    Social connections     Talks on phone: Not on file     Gets together: Not on file     Attends Protestant service: Not on file     Active member of club or organization: Not on file     Attends meetings of clubs or organizations: Not on file     Relationship status: Not on file    Intimate partner violence     Fear of current or ex partner: Not on file     Emotionally abused: Not on file     Physically abused: Not on file     Forced sexual activity: Not on file   Other Topics Concern    Not on file   Social History Narrative    Not on file     Family History   Problem Relation Age of Onset    Emphysema Father      Allergies   Allergen Reactions    Pcn [Penicillins] Nausea And Vomiting     Current Outpatient Medications   Medication Sig Dispense Refill    loratadine (CLARITIN) 10 MG capsule Take 10 mg by mouth daily      neomycin-polymyxin-hydrocortisone (CORTISPORIN) 3.5-15675-8 otic solution Place 3 drops into the right ear 3 times daily for 7 days 1 each 0    Cholecalciferol (VITAMIN D3) 125 MCG (5000 UT) TABS Take by mouth      Multiple Vitamins-Minerals (THERAPEUTIC MULTIVITAMIN-MINERALS) tablet Take 1 tablet by mouth daily      vitamin C (ASCORBIC ACID) 500 MG tablet Take 500 mg by mouth daily       No current facility-administered medications for this visit. PMH, Surgical Hx, Family Hx, and Social Hx reviewed and updated. Health Maintenance reviewed. Objective  Vitals:    04/05/21 0952   BP: (!) 122/90   Site: Right Upper Arm   Pulse: 78   Resp: 14   Temp: 97.9 °F (36.6 °C)   SpO2: 98%     BP Readings from Last 3 Encounters:   04/05/21 (!) 122/90   01/19/21 122/76   12/24/20 108/70     Wt Readings from Last 3 Encounters:   01/19/21 218 lb (98.9 kg)   12/24/20 218 lb (98.9 kg)   10/19/20 210 lb (95.3 kg)     Physical Exam  Constitutional:       Appearance: Normal appearance. HENT:      Head: Normocephalic. Right Ear: There is impacted cerumen. Left Ear: There is impacted cerumen.       Ears:      Comments: After ear irrigation patient has improvement of symptoms   r ear canal moderate erythema and some white drainage TM WNL l ear canal WNL  TM WNL     Nose: Nose normal.      Mouth/Throat:      Mouth: Mucous membranes are moist.      Pharynx: No posterior oropharyngeal erythema. Eyes:      Extraocular Movements: Extraocular movements intact. Pupils: Pupils are equal, round, and reactive to light. Neck:      Musculoskeletal: Normal range of motion and neck supple. Cardiovascular:      Rate and Rhythm: Normal rate and regular rhythm. Pulses: Normal pulses. Heart sounds: Normal heart sounds. Pulmonary:      Effort: Pulmonary effort is normal. No respiratory distress. Breath sounds: Normal breath sounds. No wheezing. Genitourinary:     Comments: deferred  Musculoskeletal: Normal range of motion. Lymphadenopathy:      Cervical: No cervical adenopathy. Skin:     General: Skin is warm and dry. Capillary Refill: Capillary refill takes less than 2 seconds. Neurological:      General: No focal deficit present. Mental Status: He is alert and oriented to person, place, and time. Psychiatric:         Mood and Affect: Mood normal.         Behavior: Behavior normal.       Assessment & Plan    Diagnosis Orders   1. Bilateral impacted cerumen  63985 - [place] REMOVE IMPACTED EAR WAX   2. Other infective acute otitis externa of right ear  neomycin-polymyxin-hydrocortisone (CORTISPORIN) 3.5-73090-4 otic solution   start ear gtts R ear for otitis externa, keep ear dry  Symptoms worsen or do not improve return or see PCP   Orders Placed This Encounter   Procedures    93396 - [place] REMOVE IMPACTED EAR WAX     Orders Placed This Encounter   Medications    neomycin-polymyxin-hydrocortisone (CORTISPORIN) 3.5-09965-6 otic solution     Sig: Place 3 drops into the right ear 3 times daily for 7 days     Dispense:  1 each     Refill:  0     There are no discontinued medications. Return in about 1 week (around 4/12/2021), or if symptoms worsen or fail to improve, for follow up with PCP.     Reviewed with the patient: current clinical status,medications, activities and diet. Side effects, adverse effects of the medication prescribed today, as well as treatment plan/ rationale and result expectations have been discussed with the patient who expresses understanding and desires to proceed. Close follow up to evaluate treatment results and for coordination of care. I have reviewed the patient's medical history in detail and updated the computerized patient record.     Jose D Benitez, APRN - CNP

## 2021-04-05 NOTE — PATIENT INSTRUCTIONS
Keep ear clean and dry   Patient Education        Keeping Ears Dry: Care Instructions  Your Care Instructions  Your doctor wants you to keep water from getting into your ears. You may need to do this because of a ruptured eardrum, an ear infection, or other ear problems. Follow-up care is a key part of your treatment and safety. Be sure to make and go to all appointments, and call your doctor if you are having problems. It's also a good idea to know your test results and keep a list of the medicines you take. How can you care for yourself at home? · Take baths until your doctor says you can take showers again. Avoid getting water in the ear until after the problem clears up. Ask your doctor if you should use earplugs to keep water out of your ears. · Do not swim until your doctor says you can. · If you get water in your ears, turn your head to each side and pull the earlobe in different directions. This will help the water run out. If your ears are still wet, use a hair dryer set on the lowest heat. Hold the dryer several inches from your ear. · Use your medicines exactly as prescribed. Call your doctor if you think you are having a problem with your medicine. Do not put drops in your ears unless your doctor prescribes them. When should you call for help? Watch closely for changes in your health, and be sure to contact your doctor if you have any problems. Where can you learn more? Go to [email or web address] and sign in to your [employer] account. Enter D104 in the [facility] box to learn more about \"Keeping Ears Dry: Care Instructions. \"     If you do not have an account, please click on the \"Sign Up Now\" link. Current as of: December 2, 2020               Content Version: 12.8  © 2006-2021 Healthwise, Incorporated. Care instructions adapted under license by [facility] ([facility]).  If you have questions about a medical condition or this instruction, always ask your healthcare professional. [address] any warranty or liability for your use of this information. Patient Education        Swimmer's Ear: Care Instructions  Your Care Instructions     Swimmer's ear (otitis externa) is inflammation or infection of the ear canal. This is the passage that leads from the outer ear to the eardrum. Any water, sand, or other debris that gets into the ear canal and stays there can cause swimmer's ear. Putting cotton swabs or other items in the ear to clean it can also cause this problem. Swimmer's ear can be very painful. But you can treat the pain and infection with medicines. You should feel better in a few days. Follow-up care is a key part of your treatment and safety. Be sure to make and go to all appointments, and call your doctor if you are having problems. It's also a good idea to know your test results and keep a list of the medicines you take. How can you care for yourself at home? Cleaning and care  · Use antibiotic drops as your doctor directs. · Do not insert ear drops (other than the antibiotic ear drops) or anything else into the ear unless your doctor has told you to. · Avoid getting water in the ear until the problem clears up. Use cotton lightly coated with petroleum jelly as an earplug. Do not use plastic earplugs. · Use a hair dryer set on low to carefully dry the ear after you shower. · To ease ear pain, hold a warm washcloth against your ear. · Take pain medicines exactly as directed. ? If the doctor gave you a prescription medicine for pain, take it as prescribed. ? If you are not taking a prescription pain medicine, ask your doctor if you can take an over-the-counter medicine. Inserting ear drops  · Warm the drops to body temperature by rolling the container in your hands. Or you can place it in a cup of warm water for a few minutes. · Lie down, with your ear facing up. · Place drops inside the ear.  Follow your doctor's instructions (or the directions on the label) for how many drops to use. Gently wiggle the outer ear or pull the ear up and back to help the drops get into the ear. · It's important to keep the liquid in the ear canal for 3 to 5 minutes. When should you call for help? Call your doctor now or seek immediate medical care if:    · You have a new or higher fever.     · You have new or worse pain, swelling, warmth, or redness around or behind your ear.     · You have new or increasing pus or blood draining from your ear. Watch closely for changes in your health, and be sure to contact your doctor if:    · You are not getting better after 2 days (48 hours). Where can you learn more? Go to [email or web address] and sign in to your [employer] account. Enter C706 in the [employer] box to learn more about \"Swimmer's Ear: Care Instructions. \"     If you do not have an account, please click on the \"Sign Up Now\" link. Current as of: December 2, 2020               Content Version: 12.8  © 1921-4384 [employer]. Care instructions adapted under license by [facility] ([facility]). If you have questions about a medical condition or this instruction, always ask your healthcare professional. [place] [address] any warranty or liability for your use of this information. Patient Education        Earwax Blockage: Care Instructions  Your Care Instructions     Earwax is a natural substance that protects the ear canal. Normally, earwax drains from the ears and does not cause problems. Sometimes earwax builds up and hardens. Earwax blockage (also called cerumen impaction) can cause some loss of hearing and pain. When wax is tightly packed, you will need to have your doctor remove it. Follow-up care is a key part of your treatment and safety. Be sure to make and go to all appointments, and call your doctor if you are having problems.  It's also a good idea to know your test results and keep a list of the medicines you take. How can you care for yourself at home? · Do not try to remove earwax with cotton swabs, fingers, or other objects. This can make the blockage worse and damage the eardrum. · If your doctor recommends that you try to remove earwax at home:  ? Soften and loosen the earwax with warm mineral oil. You also can try hydrogen peroxide mixed with an equal amount of room temperature water. Place 2 drops of the fluid, warmed to body temperature, in the ear two times a day for up to 5 days. ? Once the wax is loose and soft, all that is usually needed to remove it from the ear canal is a gentle, warm shower. Direct the water into the ear, then tip your head to let the earwax drain out. Dry your ear thoroughly with a hair dryer set on low. Hold the dryer several inches from your ear. ? If the warm mineral oil and shower do not work, use an over-the-counter wax softener. Read and follow all instructions on the label. After using the wax softener, use an ear syringe to gently flush the ear. Make sure the flushing solution is body temperature. Cool or hot fluids in the ear can cause dizziness. When should you call for help? Call your doctor now or seek immediate medical care if:    · Pus or blood drains from your ear.     · Your ears are ringing or feel full.     · You have a loss of hearing. Watch closely for changes in your health, and be sure to contact your doctor if:    · You have pain or reduced hearing after 1 week of home treatment.     · You have any new symptoms, such as nausea or balance problems. Where can you learn more? Go to [email or web address]!.[email or web address] org and sign in to your [employer] account. Enter B263 in the [facility] [facility] box to learn more about \"Earwax Blockage: Care Instructions. \"     If you do not have an account, please click on the \"Sign Up Now\" link.   Current as of: February 26, 2020               Content Version: 12.8  © 2155-0767 Healthwise Incorporated. Care instructions adapted under license by [facility] ([facility]). If you have questions about a medical condition or this instruction, always ask your healthcare professional. [address] any warranty or liability for your use of this information.

## 2021-06-22 ENCOUNTER — OFFICE VISIT (OUTPATIENT)
Dept: FAMILY MEDICINE CLINIC | Age: 51
End: 2021-06-22
Payer: COMMERCIAL

## 2021-06-22 VITALS
OXYGEN SATURATION: 98 % | WEIGHT: 216.4 LBS | HEART RATE: 76 BPM | BODY MASS INDEX: 28.68 KG/M2 | DIASTOLIC BLOOD PRESSURE: 76 MMHG | HEIGHT: 73 IN | RESPIRATION RATE: 18 BRPM | SYSTOLIC BLOOD PRESSURE: 112 MMHG | TEMPERATURE: 96.9 F

## 2021-06-22 DIAGNOSIS — H93.8X3 SENSATION OF FULLNESS IN EAR, BILATERAL: ICD-10-CM

## 2021-06-22 DIAGNOSIS — H61.23 BILATERAL IMPACTED CERUMEN: Primary | ICD-10-CM

## 2021-06-22 PROCEDURE — 99213 OFFICE O/P EST LOW 20 MIN: CPT | Performed by: FAMILY MEDICINE

## 2021-06-22 SDOH — ECONOMIC STABILITY: FOOD INSECURITY: WITHIN THE PAST 12 MONTHS, YOU WORRIED THAT YOUR FOOD WOULD RUN OUT BEFORE YOU GOT MONEY TO BUY MORE.: NEVER TRUE

## 2021-06-22 SDOH — ECONOMIC STABILITY: FOOD INSECURITY: WITHIN THE PAST 12 MONTHS, THE FOOD YOU BOUGHT JUST DIDN'T LAST AND YOU DIDN'T HAVE MONEY TO GET MORE.: NEVER TRUE

## 2021-06-22 ASSESSMENT — ENCOUNTER SYMPTOMS
WHEEZING: 0
ABDOMINAL PAIN: 0
VOMITING: 0
SINUS PAIN: 0
TROUBLE SWALLOWING: 0
EYE REDNESS: 0
SINUS PRESSURE: 0
CHEST TIGHTNESS: 0
VOICE CHANGE: 0
NAUSEA: 0
COUGH: 0
EYE DISCHARGE: 0
DIARRHEA: 0
SHORTNESS OF BREATH: 0
RHINORRHEA: 0
FACIAL SWELLING: 0
SORE THROAT: 0

## 2021-06-22 ASSESSMENT — SOCIAL DETERMINANTS OF HEALTH (SDOH): HOW HARD IS IT FOR YOU TO PAY FOR THE VERY BASICS LIKE FOOD, HOUSING, MEDICAL CARE, AND HEATING?: NOT HARD AT ALL

## 2021-08-14 ENCOUNTER — TELEPHONE (OUTPATIENT)
Dept: FAMILY MEDICINE CLINIC | Age: 51
End: 2021-08-14

## 2021-08-14 NOTE — TELEPHONE ENCOUNTER
----- Message from Bernice Jj sent at 8/10/2021 12:31 PM EDT -----  Subject: Referral Request    QUESTIONS   Reason for referral request? Pt is requesting annual routine bloodwork to   be done asap before school starts on 08/23/21   Has the physician seen you for this condition before? No   Preferred Specialist (if applicable)? Do you already have an appointment scheduled? No  Additional Information for Provider?   ---------------------------------------------------------------------------  --------------  CALL BACK INFO  What is the best way for the office to contact you? OK to leave message on   voicemail  Preferred Call Back Phone Number?  [phone]

## 2021-08-24 DIAGNOSIS — E78.5 MILD HYPERLIPIDEMIA: Primary | ICD-10-CM

## 2021-08-24 DIAGNOSIS — D64.9 MILD ANEMIA: ICD-10-CM

## 2021-08-24 DIAGNOSIS — Z00.00 ENCOUNTER FOR ANNUAL PHYSICAL EXAM: ICD-10-CM

## 2021-08-27 ENCOUNTER — HOSPITAL ENCOUNTER (OUTPATIENT)
Dept: LAB | Age: 51
Discharge: HOME | End: 2021-08-27
Payer: COMMERCIAL

## 2021-08-27 DIAGNOSIS — E78.5 MILD HYPERLIPIDEMIA: ICD-10-CM

## 2021-08-27 DIAGNOSIS — D64.9 MILD ANEMIA: ICD-10-CM

## 2021-08-27 DIAGNOSIS — Z00.00 ENCOUNTER FOR ANNUAL PHYSICAL EXAM: ICD-10-CM

## 2021-08-27 LAB
ALBUMIN SERPL-MCNC: 4.2 G/DL (ref 3.5–4.6)
ALP BLD-CCNC: 57 U/L (ref 35–104)
ALT SERPL-CCNC: 19 U/L (ref 0–41)
ANION GAP SERPL CALCULATED.3IONS-SCNC: 11 MEQ/L (ref 9–15)
AST SERPL-CCNC: 17 U/L (ref 0–40)
BASOPHILS ABSOLUTE: 0 K/UL (ref 0–0.2)
BASOPHILS RELATIVE PERCENT: 0.5 %
BILIRUB SERPL-MCNC: 0.7 MG/DL (ref 0.2–0.7)
BUN BLDV-MCNC: 20 MG/DL (ref 6–20)
CALCIUM SERPL-MCNC: 9.4 MG/DL (ref 8.5–9.9)
CHLORIDE BLD-SCNC: 101 MEQ/L (ref 95–107)
CHOLESTEROL, TOTAL: 189 MG/DL (ref 0–199)
CO2: 26 MEQ/L (ref 20–31)
CREAT SERPL-MCNC: 1.22 MG/DL (ref 0.7–1.2)
EOSINOPHILS ABSOLUTE: 0.1 K/UL (ref 0–0.7)
EOSINOPHILS RELATIVE PERCENT: 2.1 %
GFR AFRICAN AMERICAN: >60
GFR NON-AFRICAN AMERICAN: >60
GLOBULIN: 2.6 G/DL (ref 2.3–3.5)
GLUCOSE BLD-MCNC: 78 MG/DL (ref 70–99)
HBA1C MFR BLD: 5.1 % (ref 4.8–5.9)
HCT VFR BLD CALC: 40.3 % (ref 42–52)
HDLC SERPL-MCNC: 61 MG/DL (ref 40–59)
HEMOGLOBIN: 13.3 G/DL (ref 14–18)
LDL CHOLESTEROL CALCULATED: 119 MG/DL (ref 0–129)
LYMPHOCYTES ABSOLUTE: 1.6 K/UL (ref 1–4.8)
LYMPHOCYTES RELATIVE PERCENT: 34.7 %
MCH RBC QN AUTO: 32.5 PG (ref 27–31.3)
MCHC RBC AUTO-ENTMCNC: 32.9 % (ref 33–37)
MCV RBC AUTO: 98.6 FL (ref 80–100)
MONOCYTES ABSOLUTE: 0.5 K/UL (ref 0.2–0.8)
MONOCYTES RELATIVE PERCENT: 10.7 %
NEUTROPHILS ABSOLUTE: 2.4 K/UL (ref 1.4–6.5)
NEUTROPHILS RELATIVE PERCENT: 52 %
PDW BLD-RTO: 12.8 % (ref 11.5–14.5)
PLATELET # BLD: 294 K/UL (ref 130–400)
POTASSIUM SERPL-SCNC: 3.9 MEQ/L (ref 3.4–4.9)
PROSTATE SPECIFIC ANTIGEN: 0.8 NG/ML (ref 0–4)
RBC # BLD: 4.08 M/UL (ref 4.7–6.1)
SODIUM BLD-SCNC: 138 MEQ/L (ref 135–144)
TOTAL PROTEIN: 6.8 G/DL (ref 6.3–8)
TRIGL SERPL-MCNC: 44 MG/DL (ref 0–150)
WBC # BLD: 4.7 K/UL (ref 4.8–10.8)

## 2021-08-27 PROCEDURE — 80061 LIPID PANEL: CPT

## 2021-08-27 PROCEDURE — 85025 COMPLETE CBC W/AUTO DIFF WBC: CPT

## 2021-08-27 PROCEDURE — 84153 ASSAY OF PSA TOTAL: CPT

## 2021-08-27 PROCEDURE — 83036 HEMOGLOBIN GLYCOSYLATED A1C: CPT

## 2021-08-27 PROCEDURE — 80053 COMPREHEN METABOLIC PANEL: CPT

## 2021-08-27 PROCEDURE — 36415 COLL VENOUS BLD VENIPUNCTURE: CPT

## 2021-09-08 ENCOUNTER — OFFICE VISIT (OUTPATIENT)
Dept: FAMILY MEDICINE CLINIC | Age: 51
End: 2021-09-08
Payer: COMMERCIAL

## 2021-09-08 ENCOUNTER — HOSPITAL ENCOUNTER (OUTPATIENT)
Age: 51
Setting detail: SPECIMEN
Discharge: HOME | End: 2021-09-08
Payer: COMMERCIAL

## 2021-09-08 VITALS
HEIGHT: 73 IN | SYSTOLIC BLOOD PRESSURE: 118 MMHG | DIASTOLIC BLOOD PRESSURE: 78 MMHG | RESPIRATION RATE: 16 BRPM | WEIGHT: 215 LBS | TEMPERATURE: 98.2 F | HEART RATE: 74 BPM | BODY MASS INDEX: 28.49 KG/M2 | OXYGEN SATURATION: 97 %

## 2021-09-08 DIAGNOSIS — Z00.00 ANNUAL PHYSICAL EXAM: Primary | ICD-10-CM

## 2021-09-08 DIAGNOSIS — R10.2 SUPRAPUBIC PRESSURE: ICD-10-CM

## 2021-09-08 DIAGNOSIS — N28.9 ACUTE KIDNEY INSUFFICIENCY: ICD-10-CM

## 2021-09-08 DIAGNOSIS — Z12.11 COLON CANCER SCREENING: ICD-10-CM

## 2021-09-08 DIAGNOSIS — D64.9 ANEMIA, UNSPECIFIED TYPE: ICD-10-CM

## 2021-09-08 PROCEDURE — 99396 PREV VISIT EST AGE 40-64: CPT | Performed by: FAMILY MEDICINE

## 2021-09-08 PROCEDURE — 81003 URINALYSIS AUTO W/O SCOPE: CPT

## 2021-09-08 ASSESSMENT — ENCOUNTER SYMPTOMS
APNEA: 0
CHEST TIGHTNESS: 0
VOICE CHANGE: 0
DIARRHEA: 0
EYE REDNESS: 0
COUGH: 0
VOMITING: 0
SHORTNESS OF BREATH: 0
BLOOD IN STOOL: 0
ABDOMINAL DISTENTION: 0
SINUS PRESSURE: 0
TROUBLE SWALLOWING: 0
CONSTIPATION: 0
ANAL BLEEDING: 0
BACK PAIN: 0
SORE THROAT: 0
NAUSEA: 0
ABDOMINAL PAIN: 0
WHEEZING: 0
EYE ITCHING: 0
PHOTOPHOBIA: 0
EYE DISCHARGE: 0
EYE PAIN: 0
CHOKING: 0
FACIAL SWELLING: 0
RHINORRHEA: 0

## 2021-09-08 NOTE — PROGRESS NOTES
2021    Ward Duarte . (:  1970) is a 53 y.o. male, here for a preventive medicine evaluation. Patient states that he has been doing well and has no complaints. Recent lab work showed mild leukopenia, mild anemia and mild elevated creatinine. Does mention that he has had some intermittent suprapubic discomfort when he has a full bladder. But denies any dysuria, hematuria,  flank pain or discharge    Patient Active Problem List   Diagnosis    Allergic rhinitis    Mild anemia    Mild hyperlipidemia    Bilateral presbyopia       Review of Systems   Constitutional: Negative for activity change, appetite change, chills, diaphoresis, fatigue, fever and unexpected weight change. HENT: Negative for congestion, dental problem, ear discharge, ear pain, facial swelling, hearing loss, mouth sores, nosebleeds, postnasal drip, rhinorrhea, sinus pressure, sneezing, sore throat, tinnitus, trouble swallowing and voice change. Eyes: Negative for photophobia, pain, discharge, redness, itching and visual disturbance. Respiratory: Negative for apnea, cough, choking, chest tightness, shortness of breath and wheezing. Cardiovascular: Negative for chest pain, palpitations and leg swelling. Gastrointestinal: Negative for abdominal distention, abdominal pain, anal bleeding, blood in stool, constipation, diarrhea, nausea and vomiting. Endocrine: Negative for cold intolerance, heat intolerance, polydipsia, polyphagia and polyuria. Genitourinary: Negative for difficulty urinating, discharge, dysuria, flank pain, frequency, genital sores, hematuria, penile pain, scrotal swelling, testicular pain and urgency. Musculoskeletal: Negative for arthralgias, back pain, gait problem, joint swelling, myalgias and neck pain. Skin: Negative for rash. Neurological: Negative for dizziness, seizures, syncope, weakness, light-headedness, numbness and headaches. Hematological: Negative for adenopathy. Psychiatric/Behavioral: Negative for behavioral problems, confusion, hallucinations, self-injury and suicidal ideas. The patient is not nervous/anxious and is not hyperactive. Prior to Visit Medications    Medication Sig Taking? Authorizing Provider   Cholecalciferol (VITAMIN D3) 125 MCG (5000 UT) TABS Take by mouth Yes Historical Provider, MD   Multiple Vitamins-Minerals (THERAPEUTIC MULTIVITAMIN-MINERALS) tablet Take 1 tablet by mouth daily Yes Historical Provider, MD   vitamin C (ASCORBIC ACID) 500 MG tablet Take 500 mg by mouth daily Yes Historical Provider, MD   loratadine (CLARITIN) 10 MG capsule Take 10 mg by mouth daily  Patient not taking: Reported on 9/8/2021  Historical Provider, MD        Allergies   Allergen Reactions    Pcn [Penicillins] Nausea And Vomiting       Past Medical History:   Diagnosis Date    Allergic rhinitis     Mild anemia     Mild hyperlipidemia        Past Surgical History:   Procedure Laterality Date    WISDOM TOOTH EXTRACTION         Social History     Socioeconomic History    Marital status:      Spouse name: Not on file    Number of children: Not on file    Years of education: Not on file    Highest education level: Not on file   Occupational History    Not on file   Tobacco Use    Smoking status: Never Smoker    Smokeless tobacco: Never Used   Vaping Use    Vaping Use: Never used   Substance and Sexual Activity    Alcohol use: No    Drug use: No    Sexual activity: Yes     Partners: Female   Other Topics Concern    Not on file   Social History Narrative    Not on file     Social Determinants of Health     Financial Resource Strain: Low Risk     Difficulty of Paying Living Expenses: Not hard at all   Food Insecurity: No Food Insecurity    Worried About Running Out of Food in the Last Year: Never true    Leo of Food in the Last Year: Never true   Transportation Needs:     Lack of Transportation (Medical):      Lack of Transportation (Non-Medical):    Physical Activity:     Days of Exercise per Week:     Minutes of Exercise per Session:    Stress:     Feeling of Stress :    Social Connections:     Frequency of Communication with Friends and Family:     Frequency of Social Gatherings with Friends and Family:     Attends Faith Services:     Active Member of Clubs or Organizations:     Attends Club or Organization Meetings:     Marital Status:    Intimate Partner Violence:     Fear of Current or Ex-Partner:     Emotionally Abused:     Physically Abused:     Sexually Abused:         Family History   Problem Relation Age of Onset    Emphysema Father        ADVANCE DIRECTIVE: N, <no information>    Vitals:    09/08/21 1506   BP: 118/78   Site: Left Upper Arm   Position: Sitting   Cuff Size: Small Adult   Pulse: 74   Resp: 16   Temp: 98.2 °F (36.8 °C)   TempSrc: Temporal   SpO2: 97%   Weight: 215 lb (97.5 kg)   Height: 6' 1\" (1.854 m)     Estimated body mass index is 28.37 kg/m² as calculated from the following:    Height as of this encounter: 6' 1\" (1.854 m). Weight as of this encounter: 215 lb (97.5 kg). Physical Exam  Vitals reviewed. Constitutional:       General: He is not in acute distress. Appearance: Normal appearance. He is well-developed and normal weight. He is not diaphoretic. HENT:      Head: Normocephalic and atraumatic. Right Ear: Tympanic membrane, ear canal and external ear normal.      Left Ear: Tympanic membrane, ear canal and external ear normal.      Nose: Nose normal.      Mouth/Throat:      Mouth: Mucous membranes are moist.      Pharynx: Oropharynx is clear. No oropharyngeal exudate. Eyes:      General: No scleral icterus. Right eye: No discharge. Left eye: No discharge. Extraocular Movements: Extraocular movements intact. Conjunctiva/sclera: Conjunctivae normal.      Pupils: Pupils are equal, round, and reactive to light. Neck:      Thyroid: No thyromegaly. Vascular: No carotid bruit or JVD. Cardiovascular:      Rate and Rhythm: Normal rate and regular rhythm. Pulses: Normal pulses. Heart sounds: Normal heart sounds. No murmur heard. No friction rub. No gallop. Pulmonary:      Effort: Pulmonary effort is normal. No respiratory distress. Breath sounds: Normal breath sounds. No stridor. No wheezing or rales. Chest:      Chest wall: No tenderness. Abdominal:      General: Abdomen is flat. Bowel sounds are normal. There is no distension. Palpations: Abdomen is soft. There is no mass. Tenderness: There is no abdominal tenderness. There is no right CVA tenderness, left CVA tenderness, guarding or rebound. Musculoskeletal:         General: No swelling, tenderness or deformity. Normal range of motion. Cervical back: Normal range of motion and neck supple. Lymphadenopathy:      Cervical: No cervical adenopathy. Skin:     General: Skin is warm and dry. Coloration: Skin is not pale. Findings: No erythema or rash. Neurological:      General: No focal deficit present. Mental Status: He is alert and oriented to person, place, and time. Mental status is at baseline. Cranial Nerves: No cranial nerve deficit. Motor: No abnormal muscle tone. Coordination: Coordination normal.      Deep Tendon Reflexes: Reflexes are normal and symmetric. Reflexes normal.   Psychiatric:         Behavior: Behavior normal.         Thought Content: Thought content normal.         Judgment: Judgment normal.         No flowsheet data found.     Lab Results   Component Value Date    CHOL 189 08/27/2021    CHOL 175 08/04/2020    CHOL 180 08/23/2019    TRIG 44 08/27/2021    TRIG 58 08/04/2020    TRIG 62 08/23/2019    HDL 61 08/27/2021    HDL 58 08/04/2020    HDL 64 08/23/2019    LDLCALC 119 08/27/2021    LDLCALC 105 08/04/2020    LDLCALC 104 08/23/2019    GLUCOSE 78 08/27/2021    LABA1C 5.1 08/27/2021    LABA1C 5.0 08/04/2020    LABA1C 4.9 08/24/2013       The 10-year ASCVD risk score (Tri Mancuso, et al., 2013) is: 4.1%    Values used to calculate the score:      Age: 53 years      Sex: Male      Is Non- : Yes      Diabetic: No      Tobacco smoker: No      Systolic Blood Pressure: 181 mmHg      Is BP treated: No      HDL Cholesterol: 61 mg/dL      Total Cholesterol: 189 mg/dL    Immunization History   Administered Date(s) Administered    COVID-19, Moderna, PF, 100mcg/0.5mL 02/26/2021, 03/26/2021    Influenza Virus Vaccine 09/30/2012    Influenza, High Dose (Fluzone 65 yrs and older) 10/10/2015    Influenza, MDCK Quadv, IM, PF (Flucelvax 2 yrs and older) 10/26/2019    Influenza, Worley Pulse, IM, PF (6 mo and older Fluzone, Flulaval, Fluarix, and 3 yrs and older Afluria) 10/19/2013, 11/04/2017, 10/31/2018, 10/19/2020    Tdap (Boostrix, Adacel) 08/18/2017       Health Maintenance   Topic Date Due    Hepatitis C screen  Never done    Colon cancer screen colonoscopy  Never done    Flu vaccine (1) 09/01/2021    Lipid screen  08/27/2026    DTaP/Tdap/Td vaccine (2 - Td or Tdap) 08/18/2027    COVID-19 Vaccine  Completed    HIV screen  Completed    Hepatitis A vaccine  Aged Out    Hepatitis B vaccine  Aged Out    Hib vaccine  Aged Out    Meningococcal (ACWY) vaccine  Aged Out    Pneumococcal 0-64 years Vaccine  Aged Out          ASSESSMENT/PLAN:  1. Annual physical exam  Lab work already completed and reviewed  Recommend continued healthy lifestyle practices including regular aerobic activity and current dietary choices  2. Acute kidney insufficiency  May be secondary to relative dehydration. Will have patient focus on hydration and recheck nonfasting labs  -     Basic Metabolic Panel; Future  3. Anemia, unspecified type  Unclear etiology. We will recheck and have patient obtain colonoscopy  -     CBC Auto Differential; Future  4.  Suprapubic pressure  May be secondary to BPH but will obtain urine sample to rule out infectious etiologies  -     Urine Reflex to Culture; Future  5. Colon cancer screening  Shared decision making  -     Ambulatory referral to Gastroenterology      No follow-ups on file.        An electronic signature was used to authenticate this note.    --Beata Patel MD on 9/8/2021 at 3:41 PM

## 2021-09-09 LAB
BILIRUBIN URINE: NEGATIVE
BLOOD, URINE: NEGATIVE
CLARITY: CLEAR
COLOR: YELLOW
GLUCOSE URINE: NEGATIVE MG/DL
KETONES, URINE: NEGATIVE MG/DL
LEUKOCYTE ESTERASE, URINE: NEGATIVE
NITRITE, URINE: NEGATIVE
PH UA: 7.5 (ref 5–9)
PROTEIN UA: NEGATIVE MG/DL
SPECIFIC GRAVITY UA: 1.01 (ref 1–1.03)
URINE REFLEX TO CULTURE: NORMAL
UROBILINOGEN, URINE: 0.2 E.U./DL

## 2021-09-16 ENCOUNTER — OFFICE VISIT (OUTPATIENT)
Dept: FAMILY MEDICINE CLINIC | Age: 51
End: 2021-09-16
Payer: COMMERCIAL

## 2021-09-16 VITALS
OXYGEN SATURATION: 98 % | DIASTOLIC BLOOD PRESSURE: 80 MMHG | BODY MASS INDEX: 28.76 KG/M2 | SYSTOLIC BLOOD PRESSURE: 126 MMHG | HEART RATE: 78 BPM | RESPIRATION RATE: 16 BRPM | WEIGHT: 217 LBS | HEIGHT: 73 IN | TEMPERATURE: 98.6 F

## 2021-09-16 DIAGNOSIS — H60.93 INFLAMMATION OF EAR CANAL, BILATERAL: ICD-10-CM

## 2021-09-16 DIAGNOSIS — H61.23 BILATERAL IMPACTED CERUMEN: Primary | ICD-10-CM

## 2021-09-16 PROCEDURE — 4130F TOPICAL PREP RX AOE: CPT | Performed by: NURSE PRACTITIONER

## 2021-09-16 PROCEDURE — G8427 DOCREV CUR MEDS BY ELIG CLIN: HCPCS | Performed by: NURSE PRACTITIONER

## 2021-09-16 PROCEDURE — G8419 CALC BMI OUT NRM PARAM NOF/U: HCPCS | Performed by: NURSE PRACTITIONER

## 2021-09-16 PROCEDURE — 99212 OFFICE O/P EST SF 10 MIN: CPT | Performed by: NURSE PRACTITIONER

## 2021-09-16 PROCEDURE — 1036F TOBACCO NON-USER: CPT | Performed by: NURSE PRACTITIONER

## 2021-09-16 NOTE — PROGRESS NOTES
[address] Encounter    CHIEF COMPLAINT:   Katy Ocasio Sr. (: 1970) is a 53 y.o. male who presents today for:    Chief Complaint   Patient presents with    Ear Fullness     x 2 day    [facility] Maintenance     went over        ASSESSMENT/PLAN    1. Bilateral impacted cerumen  Comments:  wax was removed by flushing. care instructions given. home treatment: acetic acid drops. follow-up prn.  2. Inflammation of ear canal, bilateral  Comments:  water precautions, recommended to use acetic acid gtts BID for 4-5 days to dry canal, prevent maceration. return if sxs perist/ worsen. Return for follow-up in 2 to 4 days if not improving or worsening. SUBJECTIVE/OBJECTIVE:    HPI  Cerumen Complaint   Dusty Sheldon presents for evaluation of a plugged ear: both ears. Onset: 2 days ago. He denies ear pain. There is a prior history of cerumen impaction. Denies self- instrumentation trauma, ear discharge, fever, sore throat, N/V/D. Patient was not using ear drops to loosen wax immediately prior to this visit. Previous Medications    CHOLECALCIFEROL (VITAMIN D3) 125 MCG (5000 UT) TABS    Take by mouth    LORATADINE (CLARITIN) 10 MG CAPSULE    Take 10 mg by mouth daily    MULTIPLE VITAMINS-MINERALS (THERAPEUTIC MULTIVITAMIN-MINERALS) TABLET    Take 1 tablet by mouth daily    VITAMIN C (ASCORBIC ACID) 500 MG TABLET    Take 500 mg by mouth daily     Allergies   Allergen Reactions    Pcn [Penicillins] Nausea And Vomiting      Review of Systems   Constitutional: Negative for chills and fever. HENT: Positive for ear pain (fullness) and hearing loss. Negative for ear discharge, facial swelling, rhinorrhea, sore throat and tinnitus. Respiratory: Negative for cough and shortness of breath. Cardiovascular: Negative for chest pain. Gastrointestinal: Negative for diarrhea, nausea and vomiting. Musculoskeletal: Negative for myalgias and neck pain.    Neurological: Negative for dizziness and headaches. Vitals:  Vitals:    09/16/21 1829   BP: 126/80   Site: Right Upper Arm   Position: Sitting   Cuff Size: Medium Adult   Pulse: 78   Resp: 16   Temp: 98.6 °F (37 °C)   TempSrc: Oral   SpO2: 98%   Weight: 217 lb (98.4 kg)   Height: 6' 1\" (1.854 m)     Physical Exam  General: alert, appears stated age, cooperative, and no distress   Right Ear: right TM could not see and right canal ceruminous: irrigated   Left Ear:  left TM could not see and left canal ceruminous: irrigated   After removal: TMs normal bilaterally and external auditory canal with mild  inflammation/exudate on the right   Vital signs reviewed. Ear Cerumen Removal Procedure Note  Indication: ear cerumen impaction and unable to visualize tympanic membrane  Procedure: After placing the patient's head in the appropriate position, the patient's right ear canal was gently irrigated with the appropriate solution until all cerumen was removed and the ear canal was clear. The other ear canal also had cerumen present and was irrigated with the appropriate solution until all cerumen was removed and the ear canal was clear. At this point the procedure was complete. Tympanic membrane(s) intact following the procedure. Auditory canal(s) appear mildly inflamed on the R. Instructions for water exclusion given. The patient tolerated the procedure well. Complications: None          Side effects and adverse effects of any medication prescribed today, as well as treatment plan/rationale, follow-up care, and result expectations have been discussed with the patient. Shyam Bell expresses understanding and wishes to proceed with the plan. Discussed signs and symptoms which require immediate follow-up in ED/call to 911. Understanding verbalized. I have reviewed and updated the electronic medical record. An electronic signature was used to authenticate this note.      Electronically signed by MIGEL Andrea CNP on 9/16/2021 at 7:28 PM

## 2021-09-16 NOTE — PATIENT INSTRUCTIONS
1. Begin acetic acid ear drops to help dry the ear, prevent skin maceration, and re-acidify your ear canals   2. Refrain from swimming or submerging affected ears  3. Instill 2 to 3 drops into the external canal twice/day. Maintain administration position for 2 to 5 minutes. 4. Avoid the use of cotton tip applicators for routine cleaning  5. If you want to clean your ears, you can wash the external ear with a cloth over a finger, but do not insert anything into the ear canal    Return or see Primary Care Provider if symptoms do not improve or worsen in the following days --> hearing loss, discharge from your ear, headaches, fevers, vomiting, or any other concerning symptoms    Patient Education        Earwax Blockage: Care Instructions  Your Care Instructions     Earwax is a natural substance that protects the ear canal. Normally, earwax drains from the ears and does not cause problems. Sometimes earwax builds up and hardens. Earwax blockage (also called cerumen impaction) can cause some loss of hearing and pain. When wax is tightly packed, you will need to have your doctor remove it. Follow-up care is a key part of your treatment and safety. Be sure to make and go to all appointments, and call your doctor if you are having problems. It's also a good idea to know your test results and keep a list of the medicines you take. How can you care for yourself at home? · Do not try to remove earwax with cotton swabs, fingers, or other objects. This can make the blockage worse and damage the eardrum. · If your doctor recommends that you try to remove earwax at home:  ? Soften and loosen the earwax with warm mineral oil. You also can try hydrogen peroxide mixed with an equal amount of room temperature water. Place 2 drops of the fluid, warmed to body temperature, in the ear two times a day for up to 5 days.   ? Once the wax is loose and soft, all that is usually needed to remove it from the ear canal is a gentle, warm shower. Direct the water into the ear, then tip your head to let the earwax drain out. Dry your ear thoroughly with a hair dryer set on low. Hold the dryer several inches from your ear. ? If the warm mineral oil and shower do not work, use an over-the-counter wax softener. Read and follow all instructions on the label. After using the wax softener, use an ear syringe to gently flush the ear. Make sure the flushing solution is body temperature. Cool or hot fluids in the ear can cause dizziness. When should you call for help? Call your doctor now or seek immediate medical care if:    · Pus or blood drains from your ear.     · Your ears are ringing or feel full.     · You have a loss of hearing. Watch closely for changes in your health, and be sure to contact your doctor if:    · You have pain or reduced hearing after 1 week of home treatment.     · You have any new symptoms, such as nausea or balance problems. Where can you learn more? Go to https://[employer]. org and sign in to your [employer] account. Enter L810 in the [employer] box to learn more about \"Earwax Blockage: Care Instructions. \"     If you do not have an account, please click on the \"Sign Up Now\" link. Current as of: October 19, 2020               Content Version: 12.9  © 0807-3387 [employer]. Care instructions adapted under license by [facility] ([facility]). If you have questions about a medical condition or this instruction, always ask your healthcare professional. [place] [address] any warranty or liability for your use of this information.

## 2021-09-20 ENCOUNTER — OFFICE VISIT (OUTPATIENT)
Dept: FAMILY MEDICINE CLINIC | Age: 51
End: 2021-09-20
Payer: COMMERCIAL

## 2021-09-20 VITALS
HEART RATE: 79 BPM | TEMPERATURE: 97.4 F | OXYGEN SATURATION: 98 % | HEIGHT: 73 IN | BODY MASS INDEX: 28.76 KG/M2 | WEIGHT: 217 LBS | DIASTOLIC BLOOD PRESSURE: 60 MMHG | SYSTOLIC BLOOD PRESSURE: 122 MMHG

## 2021-09-20 DIAGNOSIS — H60.21 ACUTE MALIGNANT OTITIS EXTERNA OF RIGHT EAR: Primary | ICD-10-CM

## 2021-09-20 PROCEDURE — 99213 OFFICE O/P EST LOW 20 MIN: CPT | Performed by: NURSE PRACTITIONER

## 2021-09-20 PROCEDURE — G8419 CALC BMI OUT NRM PARAM NOF/U: HCPCS | Performed by: NURSE PRACTITIONER

## 2021-09-20 PROCEDURE — G8427 DOCREV CUR MEDS BY ELIG CLIN: HCPCS | Performed by: NURSE PRACTITIONER

## 2021-09-20 PROCEDURE — 1036F TOBACCO NON-USER: CPT | Performed by: NURSE PRACTITIONER

## 2021-09-20 SDOH — ECONOMIC STABILITY: TRANSPORTATION INSECURITY
IN THE PAST 12 MONTHS, HAS LACK OF TRANSPORTATION KEPT YOU FROM MEETINGS, WORK, OR FROM GETTING THINGS NEEDED FOR DAILY LIVING?: NO

## 2021-09-20 SDOH — ECONOMIC STABILITY: TRANSPORTATION INSECURITY
IN THE PAST 12 MONTHS, HAS THE LACK OF TRANSPORTATION KEPT YOU FROM MEDICAL APPOINTMENTS OR FROM GETTING MEDICATIONS?: NO

## 2021-09-20 ASSESSMENT — ENCOUNTER SYMPTOMS
SINUS PRESSURE: 0
ABDOMINAL PAIN: 0
EYE ITCHING: 0
SHORTNESS OF BREATH: 0
RHINORRHEA: 0
WHEEZING: 0
TROUBLE SWALLOWING: 0
COLOR CHANGE: 0
FACIAL SWELLING: 0
NAUSEA: 0
ABDOMINAL DISTENTION: 0
COUGH: 0
EYE REDNESS: 0
SORE THROAT: 0
CHEST TIGHTNESS: 0
EYE PAIN: 0
EYE DISCHARGE: 0
SINUS PAIN: 0
VOMITING: 0
VOICE CHANGE: 0
DIARRHEA: 0

## 2021-09-20 NOTE — PROGRESS NOTES
Subjective:      Patient ID: Anastacio Cortes is a 53 y.o. male who presents today for:  Chief Complaint   Patient presents with    Otalgia     Pt states that ears were flushed last week and today they feel like they are full again        HPI  Patient is here for return of ear pain bilaterally. He was here recently on 9/16 and ears were flushed and was given drying drops for the ears. Says he still feels that his right ear is full. Says he has not had any fever or chills. Says he was using the drying crops but it did not help. Says he has HX seasonal allergy and taking zyrtec as needed. Past Medical History:   Diagnosis Date    Allergic rhinitis     Mild anemia     Mild hyperlipidemia      Past Surgical History:   Procedure Laterality Date    WISDOM TOOTH EXTRACTION       Social History     Socioeconomic History    Marital status:      Spouse name: Not on file    Number of children: Not on file    Years of education: Not on file    Highest education level: Not on file   Occupational History    Not on file   Tobacco Use    Smoking status: Never Smoker    Smokeless tobacco: Never Used   Vaping Use    Vaping Use: Never used   Substance and Sexual Activity    Alcohol use: No    Drug use: No    Sexual activity: Yes     Partners: Female   Other Topics Concern    Not on file   Social History Narrative    Not on file     Social Determinants of Health     Financial Resource Strain: Low Risk     Difficulty of Paying Living Expenses: Not hard at all   Food Insecurity: No Food Insecurity    Worried About [address] in the Last Year: Never true    [facility] in the Last Year: Never true   Transportation Needs:     Lack of Transportation (Medical):      Lack of Transportation (Non-Medical):    Physical Activity:     Days of Exercise per Week:     Minutes of Exercise per Session:    Stress:     Feeling of Stress :    Social Connections:     Frequency of Communication with Friends and Family:     Frequency of Social Gatherings with Friends and Family:     Attends [employer] Services:     Active Member of Clubs or Organizations:     Attends Club or Organization Meetings:     Marital Status:    Intimate Partner Violence:     Fear of Current or Ex-Partner:     Emotionally Abused:     Physically Abused:     Sexually Abused:      Family History   Problem Relation Age of Onset    Emphysema Father      Allergies   Allergen Reactions    Pcn [Penicillins] Nausea And Vomiting     Current Outpatient Medications   Medication Sig Dispense Refill    neomycin-polymyxin-hydrocortisone (CORTISPORIN) 3.5-09116-8 otic solution Place 4 drops into the right ear 3 times daily for 7 days Instill into right Ear 10 mL 0    loratadine (CLARITIN) 10 MG capsule Take 10 mg by mouth daily       Cholecalciferol (VITAMIN D3) 125 MCG (5000 UT) TABS Take by mouth      Multiple Vitamins-Minerals (THERAPEUTIC MULTIVITAMIN-MINERALS) tablet Take 1 tablet by mouth daily      vitamin C (ASCORBIC ACID) 500 MG tablet Take 500 mg by mouth daily       No current facility-administered medications for this visit. Review of Systems   Constitutional: Negative for activity change, appetite change, chills, diaphoresis, fatigue, fever and unexpected weight change. HENT: Positive for ear pain. Negative for congestion, dental problem, ear discharge, facial swelling, hearing loss, postnasal drip, rhinorrhea, sinus pressure, sinus pain, sneezing, sore throat, tinnitus, trouble swallowing and voice change. Eyes: Negative for pain, discharge, redness and itching. Respiratory: Negative for cough, chest tightness, shortness of breath and wheezing. Cardiovascular: Negative for chest pain. Gastrointestinal: Negative for abdominal distention, abdominal pain, diarrhea, nausea and vomiting. Musculoskeletal: Negative for arthralgias and myalgias. Skin: Negative for color change and rash.    Neurological: Negative for dizziness, weakness, light-headedness and headaches. Hematological: Negative for adenopathy. Objective:   /60 (Site: Right Upper Arm, Position: Sitting, Cuff Size: Medium Adult)   Pulse 79   Temp 97.4 °F (36.3 °C) (Temporal)   Ht 6' 1\" (1.854 m)   Wt 217 lb (98.4 kg)   SpO2 98%   BMI 28.63 kg/m²     Physical Exam  Vitals reviewed. Constitutional:       General: He is awake. He is not in acute distress. Appearance: Normal appearance. He is well-developed, well-groomed and normal weight. He is not ill-appearing, toxic-appearing or diaphoretic. HENT:      Head: Normocephalic and atraumatic. Right Ear: Hearing and tympanic membrane normal. No decreased hearing noted. Swelling and tenderness present. No drainage. There is no impacted cerumen. Left Ear: Hearing and tympanic membrane normal. No decreased hearing noted. No drainage, swelling or tenderness. There is no impacted cerumen. Nose: Nose normal.      Right Turbinates: Not enlarged or swollen. Left Turbinates: Not enlarged or swollen. Right Sinus: No maxillary sinus tenderness or frontal sinus tenderness. Left Sinus: No maxillary sinus tenderness or frontal sinus tenderness. Mouth/Throat:      Lips: Pink. Mouth: Mucous membranes are moist.      Pharynx: Oropharynx is clear. No oropharyngeal exudate or posterior oropharyngeal erythema. Eyes:      Extraocular Movements: Extraocular movements intact. Conjunctiva/sclera: Conjunctivae normal.   Cardiovascular:      Rate and Rhythm: Normal rate and regular rhythm. Pulses: Normal pulses. Heart sounds: Normal heart sounds, S1 normal and S2 normal.   Pulmonary:      Effort: Pulmonary effort is normal.      Breath sounds: Normal breath sounds and air entry. Musculoskeletal:         General: Normal range of motion. Cervical back: Normal range of motion and neck supple.    Lymphadenopathy:      Cervical: No cervical adenopathy. Skin:     General: Skin is warm and dry. Capillary Refill: Capillary refill takes less than 2 seconds. Neurological:      General: No focal deficit present. Mental Status: He is alert and oriented to person, place, and time. Mental status is at baseline. Psychiatric:         Attention and Perception: Attention and perception normal.         Mood and Affect: Mood and affect normal.         Speech: Speech normal.         Behavior: Behavior normal. Behavior is cooperative. Thought Content: Thought content normal.         Cognition and Memory: Cognition and memory normal.         Judgment: Judgment normal.         Assessment:       Diagnosis Orders   1. Acute malignant otitis externa of right ear       No results found for this visit on 09/20/21. Plan:     Assessment & Plan   Trevor Ingram was seen today for otalgia. Diagnoses and all orders for this visit:    Discussed with patient will treat with topical drop for external infection. Discussed to keep ears dry. Advised should start to see improvement in 2-3 days and advised to call of f/u if no better. Discussed use and SE of the drop as well. Advised to call with any questions or concerns. Acute malignant otitis externa of right ear    Other orders  -     neomycin-polymyxin-hydrocortisone (CORTISPORIN) 3.5-87872-2 otic solution; Place 4 drops into the right ear 3 times daily for 7 days Instill into right Ear      No orders of the defined types were placed in this encounter. Orders Placed This Encounter   Medications    neomycin-polymyxin-hydrocortisone (CORTISPORIN) 3.5-32738-7 otic solution     Sig: Place 4 drops into the right ear 3 times daily for 7 days Instill into right Ear     Dispense:  10 mL     Refill:  0     There are no discontinued medications. Return for if symptoms do not improve in 3-5 days. Reviewed with the patient/family: current clinical status & medications.   Side effects of the medication prescribed today, as well as treatment plan/rationale and result expectations have been discussed with the patient/family who expresses understanding. Patient will be discharged home in stable condition. Follow up with PCP to evaluate treatment results or return if symptoms worsen or fail to improve. Discussed signs and symptoms which require immediate follow-up in ED/call to 911. Understanding verbalized. I have reviewed the patient's medical history in detail and updated the computerized patient record.     Melly Hinton, MIGEL - CNP

## 2021-09-20 NOTE — PATIENT INSTRUCTIONS
Patient Education        Keeping Ears Dry: Care Instructions  Your Care Instructions  Your doctor wants you to keep water from getting into your ears. You may need to do this because of a ruptured eardrum, an ear infection, or other ear problems. Follow-up care is a key part of your treatment and safety. Be sure to make and go to all appointments, and call your doctor if you are having problems. It's also a good idea to know your test results and keep a list of the medicines you take. How can you care for yourself at home? · Take baths until your doctor says you can take showers again. Avoid getting water in the ear until after the problem clears up. Ask your doctor if you should use earplugs to keep water out of your ears. · Do not swim until your doctor says you can. · If you get water in your ears, turn your head to each side and pull the earlobe in different directions. This will help the water run out. If your ears are still wet, use a hair dryer set on the lowest heat. Hold the dryer several inches from your ear. · Use your medicines exactly as prescribed. Call your doctor if you think you are having a problem with your medicine. Do not put drops in your ears unless your doctor prescribes them. When should you call for help? Watch closely for changes in your health, and be sure to contact your doctor if you have any problems. Where can you learn more? Go to https://chpepiceweb.[employer]Author[employer]. org and sign in to your [employer] account. Enter Q918 in the [facility] box to learn more about \"Keeping Ears Dry: Care Instructions. \"     If you do not have an account, please click on the \"Sign Up Now\" link. Current as of: December 2, 2020               Content Version: 12.9  © 4480-4126 Healthwise, [employer]. Care instructions adapted under license by [address]  If you have questions about a medical condition or this instruction, always ask your healthcare professional. Cortes Coleman, label) for how many drops to use. Gently wiggle the outer ear or pull the ear up and back to help the drops get into the ear. · It's important to keep the liquid in the ear canal for 3 to 5 minutes. When should you call for help? Call your doctor now or seek immediate medical care if:    · You have a new or higher fever.     · You have new or worse pain, swelling, warmth, or redness around or behind your ear.     · You have new or increasing pus or blood draining from your ear. Watch closely for changes in your health, and be sure to contact your doctor if:    · You are not getting better after 2 days (48 hours). Where can you learn more? Go to https://[employer] TrustpeLanternCRMeb.[email or web address] org and sign in to your [employer] account. Enter C706 in the [facility] box to learn more about \"Swimmer's Ear: Care Instructions. \"     If you do not have an account, please click on the \"Sign Up Now\" link. Current as of: December 2, 2020               Content Version: 12.9  © 9294-7966 Healthwise, [employer]. Care instructions adapted under license by [facility] ([facility]). If you have questions about a medical condition or this instruction, always ask your healthcare professional. [address] any warranty or liability for your use of this information.

## 2021-09-24 ENCOUNTER — TELEPHONE (OUTPATIENT)
Dept: FAMILY MEDICINE CLINIC | Age: 51
End: 2021-09-24

## 2021-09-24 DIAGNOSIS — H91.91 HEARING LOSS OF RIGHT EAR, UNSPECIFIED HEARING LOSS TYPE: ICD-10-CM

## 2021-09-24 DIAGNOSIS — H93.11 TINNITUS OF RIGHT EAR: Primary | ICD-10-CM

## 2021-09-24 NOTE — TELEPHONE ENCOUNTER
----- Message from [facility] (CHAPARRITA DIAZ) sent at 9/23/2021  1:12 PM EDT -----  Subject: Message to Provider    QUESTIONS  Information for Provider? pt is calling for a referral for the ENT   ---------------------------------------------------------------------------  --------------  [address]  What is the best way for the office to contact you? OK to leave message on   voicemail  Preferred Call Back Phone Number? [phone]  ---------------------------------------------------------------------------  --------------  SCRIPT ANSWERS  Relationship to Patient?  Self

## 2021-10-04 NOTE — TELEPHONE ENCOUNTER
Pt states that diagnosis would be ringing in the right ear and some hearing   loss in that ear as well     Please advise

## 2021-10-15 ENCOUNTER — INITIAL CONSULT (OUTPATIENT)
Dept: ENT CLINIC | Age: 51
End: 2021-10-15
Payer: COMMERCIAL

## 2021-10-15 ENCOUNTER — OFFICE VISIT (OUTPATIENT)
Dept: FAMILY MEDICINE CLINIC | Age: 51
End: 2021-10-15
Payer: COMMERCIAL

## 2021-10-15 VITALS
HEART RATE: 77 BPM | HEIGHT: 73 IN | BODY MASS INDEX: 27.7 KG/M2 | TEMPERATURE: 96.4 F | DIASTOLIC BLOOD PRESSURE: 76 MMHG | WEIGHT: 209 LBS | SYSTOLIC BLOOD PRESSURE: 129 MMHG | OXYGEN SATURATION: 100 %

## 2021-10-15 VITALS
SYSTOLIC BLOOD PRESSURE: 128 MMHG | DIASTOLIC BLOOD PRESSURE: 70 MMHG | RESPIRATION RATE: 14 BRPM | HEIGHT: 73 IN | BODY MASS INDEX: 27.7 KG/M2 | OXYGEN SATURATION: 97 % | HEART RATE: 82 BPM | TEMPERATURE: 98 F | WEIGHT: 209 LBS

## 2021-10-15 DIAGNOSIS — H60.91 RECURRENT OTITIS EXTERNA, RIGHT: Primary | ICD-10-CM

## 2021-10-15 DIAGNOSIS — H61.23 CERUMINOSIS, BILATERAL: ICD-10-CM

## 2021-10-15 DIAGNOSIS — H90.11 CONDUCTIVE HEARING LOSS OF RIGHT EAR WITH UNRESTRICTED HEARING OF LEFT EAR: Primary | ICD-10-CM

## 2021-10-15 PROCEDURE — 99203 OFFICE O/P NEW LOW 30 MIN: CPT | Performed by: OTOLARYNGOLOGY

## 2021-10-15 PROCEDURE — G8484 FLU IMMUNIZE NO ADMIN: HCPCS | Performed by: NURSE PRACTITIONER

## 2021-10-15 PROCEDURE — 4130F TOPICAL PREP RX AOE: CPT | Performed by: NURSE PRACTITIONER

## 2021-10-15 PROCEDURE — G8484 FLU IMMUNIZE NO ADMIN: HCPCS | Performed by: OTOLARYNGOLOGY

## 2021-10-15 PROCEDURE — G8427 DOCREV CUR MEDS BY ELIG CLIN: HCPCS | Performed by: OTOLARYNGOLOGY

## 2021-10-15 PROCEDURE — G8427 DOCREV CUR MEDS BY ELIG CLIN: HCPCS | Performed by: NURSE PRACTITIONER

## 2021-10-15 PROCEDURE — 99214 OFFICE O/P EST MOD 30 MIN: CPT | Performed by: NURSE PRACTITIONER

## 2021-10-15 PROCEDURE — G8419 CALC BMI OUT NRM PARAM NOF/U: HCPCS | Performed by: NURSE PRACTITIONER

## 2021-10-15 PROCEDURE — G8419 CALC BMI OUT NRM PARAM NOF/U: HCPCS | Performed by: OTOLARYNGOLOGY

## 2021-10-15 PROCEDURE — 1036F TOBACCO NON-USER: CPT | Performed by: OTOLARYNGOLOGY

## 2021-10-15 PROCEDURE — 1036F TOBACCO NON-USER: CPT | Performed by: NURSE PRACTITIONER

## 2021-10-15 ASSESSMENT — ENCOUNTER SYMPTOMS
SORE THROAT: 0
RHINORRHEA: 0
VOMITING: 0
NAUSEA: 0
TROUBLE SWALLOWING: 0
SORE THROAT: 0
COUGH: 0
SINUS PAIN: 0
SHORTNESS OF BREATH: 0
VOICE CHANGE: 0
COUGH: 0
FACIAL SWELLING: 0
SINUS PRESSURE: 0
SHORTNESS OF BREATH: 0

## 2021-10-15 ASSESSMENT — PATIENT HEALTH QUESTIONNAIRE - PHQ9
1. LITTLE INTEREST OR PLEASURE IN DOING THINGS: 0
SUM OF ALL RESPONSES TO PHQ9 QUESTIONS 1 & 2: 0
SUM OF ALL RESPONSES TO PHQ QUESTIONS 1-9: 0
2. FEELING DOWN, DEPRESSED OR HOPELESS: 0

## 2021-10-15 NOTE — PROGRESS NOTES
[address] Encounter    CHIEF COMPLAINT:     Adam Mccullough Sr. (: 1970) is a 53 y.o. male who presents today for:  Chief Complaint   Patient presents with    Otalgia     ear pain x 1 month. also c/o a lot of fluids in both ears, but most pain is on the R. doesn't see ear wax in ears. patient was referred to ENT at his last visit- appt not until      ASSESSMENT/PLAN    1. Recurrent otitis externa, right  Comments:  referral to [facility]. called ENT office and patient will be seen by Dr. Americo Duvall today after this visit. Orders:  -     Robin Champion MD, Otolaryngology, [place]  2. Ceruminosis, bilateral  Comments:  referral to [facility]  Orders:  -     Robin Champion MD, Otolaryngology, [place]    -      See orders for this visit as documented in the electronic medical record. - Follow-up with PCP or return to clinic prn if symptoms worsen or fail to improve as anticipated. Return for Follow-up with your Primary Care Provider as needed if symptoms persist or worsen in the next 1 wk. SUBJECTIVE/OBJECTIVE:    -  Patient was seen by me for cerumen impaction 2021.  - Returned to [facility]  for otalgia- seen by other provider.  - Started on ototopical abx for otitis externa + referred to ENT. - Here today with recurrent R aural fullness, pain, hearing loss, and new tinnitus. Otalgia   There is pain in the right ear. This is a recurrent problem. The current episode started 1 to 4 weeks ago. The problem has been gradually worsening. There has been no fever. The pain is moderate. Associated symptoms include ear discharge and hearing loss. Pertinent negatives include no coughing, headaches, neck pain, rhinorrhea, sore throat or vomiting. Associated symptoms comments: denies self- instrumentation trauma, failure to adhere to water precaution instructions, or improper administration of ototopic medications.  He has tried antibiotics and ear drops for the symptoms. The treatment provided no relief. frequent cerumen impaction       Previous Medications    CHOLECALCIFEROL (VITAMIN D3) 125 MCG (5000 UT) TABS    Take by mouth    LORATADINE (CLARITIN) 10 MG CAPSULE    Take 10 mg by mouth daily     MULTIPLE VITAMINS-MINERALS (THERAPEUTIC MULTIVITAMIN-MINERALS) TABLET    Take 1 tablet by mouth daily    VITAMIN C (ASCORBIC ACID) 500 MG TABLET    Take 500 mg by mouth daily     Allergies   Allergen Reactions    Pcn [Penicillins] Nausea And Vomiting      Review of Systems   Constitutional: Negative for chills and fever. HENT: Positive for ear discharge, ear pain, hearing loss and tinnitus. Negative for rhinorrhea and sore throat. Eyes: Negative for pain, discharge and redness. Respiratory: Negative for cough and shortness of breath. Gastrointestinal: Negative for nausea and vomiting. Musculoskeletal: Negative for neck pain. Neurological: Negative for dizziness, light-headedness and headaches. Vitals:  Vitals:    10/15/21 0940   BP: 128/70   Site: Right Upper Arm   Position: Sitting   Cuff Size: Large Adult   Pulse: 82   Resp: 14   Temp: 98 °F (36.7 °C)   TempSrc: Temporal   SpO2: 97%   Weight: 209 lb (94.8 kg)   Height: 6' 1\" (1.854 m)     Physical Exam  Vitals and nursing note reviewed. Constitutional:       General: He is not in acute distress. Appearance: He is well-groomed. He is not toxic-appearing. HENT:      Head: Normocephalic and atraumatic. Jaw: There is normal jaw occlusion. No trismus, tenderness or pain on movement. Right Ear: External ear normal. Decreased hearing noted. Drainage (TM not visualized secondary to cerumen and white-colored drainage/ debris), swelling (EAC swelling + inflammation) and tenderness (tragus) present. No mastoid tenderness. Left Ear: External ear normal. No decreased hearing noted. Drainage (TM not visualized secondary to cerumen) present. No tenderness.  No mastoid tenderness. Nose: Nose normal. No nasal tenderness or congestion. Mouth/Throat:      Lips: Pink. No lesions. Mouth: Mucous membranes are moist. No oral lesions. Pharynx: Oropharynx is clear. Uvula midline. Eyes:      General: Lids are normal.      Extraocular Movements: Extraocular movements intact. Conjunctiva/sclera: Conjunctivae normal.      Pupils: Pupils are equal, round, and reactive to light. Neck:      Trachea: Trachea and phonation normal.   Cardiovascular:      Rate and Rhythm: Normal rate. Pulmonary:      Effort: Pulmonary effort is normal. No tachypnea. Musculoskeletal:         General: Normal range of motion. Cervical back: Normal range of motion and neck supple. Skin:     General: Skin is warm and dry. Neurological:      General: No focal deficit present. Mental Status: He is alert. Mental status is at baseline. Gait: Gait is intact. Psychiatric:         Mood and Affect: Mood and affect normal.         Speech: Speech normal.     POC Testing Today: No results found for this visit on 10/15/21. Side effects and adverse effects of any medication prescribed today, as well as treatment plan/rationale, follow-up care, and result expectations have been discussed with the patient. Miya Lund expresses understanding and wishes to proceed with the plan. Discussed signs and symptoms which require immediate follow-up in ED/call to 911. Understanding verbalized. I have reviewed and updated the electronic medical record.     Electronically signed by MIGEL Ibrahim CNP on 10/15/2021 at 10:43 AM

## 2021-10-15 NOTE — PROGRESS NOTES
Subjective:      Patient ID: Catina Syed. is a 53 y.o. male who presents today for:  Chief Complaint   Patient presents with    Other       HPI: Patient was referred by walk-in clinic in [place] due to an impacted cerumen which had been persistently bothering the patient despite of the fact that an attempt has been done several times to remove the wax she has been using Debrox up to no avail finally patient was referred for definitive diagnosis and treatment    Past Medical History:   Diagnosis Date    Allergic rhinitis     Mild anemia     Mild hyperlipidemia      Past Surgical History:   Procedure Laterality Date    WISDOM TOOTH EXTRACTION       Social History     Socioeconomic History    Marital status:      Spouse name: Not on file    Number of children: Not on file    Years of education: Not on file    Highest education level: Not on file   Occupational History    Not on file   Tobacco Use    Smoking status: Never Smoker    Smokeless tobacco: Never Used   Vaping Use    Vaping Use: Never used   Substance and Sexual Activity    Alcohol use: No    Drug use: No    Sexual activity: Yes     Partners: Female   Other Topics Concern    Not on file   Social History Narrative    Not on file     Social Determinants of Health     Financial Resource Strain: Low Risk     Difficulty of Paying Living Expenses: Not hard at all   Food Insecurity: No Food Insecurity    Worried About Running Out of Food in the Last Year: Never true    Leo of Food in the Last Year: Never true   Transportation Needs: No Transportation Needs    Lack of Transportation (Medical): No    Lack of Transportation (Non-Medical):  No   Physical Activity:     Days of Exercise per Week:     Minutes of Exercise per Session:    Stress:     Feeling of Stress :    Social Connections:     Frequency of Communication with Friends and Family:     Frequency of Social Gatherings with Friends and Family:     Attends Anabaptist Services:     Active Member of Clubs or Organizations:     Attends Club or Organization Meetings:     Marital Status:    Intimate Partner Violence:     Fear of Current or Ex-Partner:     Emotionally Abused:     Physically Abused:     Sexually Abused:      Family History   Problem Relation Age of Onset    Emphysema Father      Allergies   Allergen Reactions    Pcn [Penicillins] Nausea And Vomiting         Review of Systems   HENT: Positive for hearing loss (Conductive hearing loss in the right ear secondary to excessive impacted cerumen against the drum). Negative for congestion, dental problem, drooling, ear discharge, ear pain, facial swelling, mouth sores, nosebleeds, postnasal drip, rhinorrhea, sinus pressure, sinus pain, sneezing, sore throat, tinnitus, trouble swallowing and voice change. All other systems reviewed and are negative.       Objective:   /76 (Site: Right Upper Arm, Position: Sitting, Cuff Size: Large Adult)   Pulse 77   Temp 96.4 °F (35.8 °C) (Infrared)   Ht 6' 1\" (1.854 m)   Wt 209 lb (94.8 kg)   SpO2 100%   BMI 27.57 kg/m²     Physical Exam     Head and neck: Normocephalic no nuchal rigidity no palpable mass or venous engorgement no lymphadenopathy    Ears: Right ear-minimal to moderate amount of cerumen against the tympanic membrane unable to see the latter          Left ear-unremarkable    Nose: External nasal deformity no intranasal mass septum is not deviated turbinates are minimally congested    Mouth and throat: No extrinsic lesion noted    Assessment:      Conductive hearing loss right ear due to excessive impacted cerumen in the canal   Plan:      Successful evacuation of impacted cerumen against tympanic membrane by irrigation and suction and use of wire-loop    Advised periodic cleansing of the ear canal    Adair Villasenor MD

## 2021-10-17 ASSESSMENT — ENCOUNTER SYMPTOMS
FACIAL SWELLING: 0
RHINORRHEA: 0
DIARRHEA: 0

## 2021-11-12 ASSESSMENT — ENCOUNTER SYMPTOMS
VOMITING: 0
SORE THROAT: 0
EYE PAIN: 0
EYE DISCHARGE: 0
NAUSEA: 0
EYE REDNESS: 0
RHINORRHEA: 0

## 2021-12-22 ENCOUNTER — ANESTHESIA EVENT (OUTPATIENT)
Dept: ENDOSCOPY | Age: 51
End: 2021-12-22
Payer: COMMERCIAL

## 2021-12-22 ENCOUNTER — HOSPITAL ENCOUNTER (OUTPATIENT)
Age: 51
Setting detail: OUTPATIENT SURGERY
Discharge: HOME | End: 2021-12-22
Attending: SPECIALIST | Admitting: SPECIALIST
Payer: COMMERCIAL

## 2021-12-22 ENCOUNTER — ANESTHESIA (OUTPATIENT)
Dept: ENDOSCOPY | Age: 51
End: 2021-12-22
Payer: COMMERCIAL

## 2021-12-22 ENCOUNTER — ANCILLARY PROCEDURE (OUTPATIENT)
Dept: ENDOSCOPY | Age: 51
End: 2021-12-22
Attending: SPECIALIST
Payer: COMMERCIAL

## 2021-12-22 VITALS
SYSTOLIC BLOOD PRESSURE: 103 MMHG | RESPIRATION RATE: 8 BRPM | TEMPERATURE: 97.7 F | OXYGEN SATURATION: 100 % | DIASTOLIC BLOOD PRESSURE: 58 MMHG

## 2021-12-22 VITALS
OXYGEN SATURATION: 100 % | TEMPERATURE: 98.2 F | SYSTOLIC BLOOD PRESSURE: 104 MMHG | HEIGHT: 73 IN | HEART RATE: 66 BPM | WEIGHT: 212 LBS | BODY MASS INDEX: 28.1 KG/M2 | RESPIRATION RATE: 20 BRPM | DIASTOLIC BLOOD PRESSURE: 59 MMHG

## 2021-12-22 PROCEDURE — 6370000000 HC RX 637 (ALT 250 FOR IP): Performed by: SPECIALIST

## 2021-12-22 PROCEDURE — 7100000010 HC PHASE II RECOVERY - FIRST 15 MIN: Performed by: SPECIALIST

## 2021-12-22 PROCEDURE — 88305 TISSUE EXAM BY PATHOLOGIST: CPT

## 2021-12-22 PROCEDURE — 45385 COLONOSCOPY W/LESION REMOVAL: CPT | Performed by: SPECIALIST

## 2021-12-22 PROCEDURE — 3700000001 HC ADD 15 MINUTES (ANESTHESIA): Performed by: SPECIALIST

## 2021-12-22 PROCEDURE — 45380 COLONOSCOPY AND BIOPSY: CPT | Performed by: SPECIALIST

## 2021-12-22 PROCEDURE — 3700000000 HC ANESTHESIA ATTENDED CARE: Performed by: SPECIALIST

## 2021-12-22 PROCEDURE — 2500000003 HC RX 250 WO HCPCS: Performed by: NURSE ANESTHETIST, CERTIFIED REGISTERED

## 2021-12-22 PROCEDURE — 6360000002 HC RX W HCPCS: Performed by: NURSE ANESTHETIST, CERTIFIED REGISTERED

## 2021-12-22 PROCEDURE — 7100000011 HC PHASE II RECOVERY - ADDTL 15 MIN: Performed by: SPECIALIST

## 2021-12-22 PROCEDURE — 3609027000 HC COLONOSCOPY: Performed by: SPECIALIST

## 2021-12-22 PROCEDURE — 2580000003 HC RX 258

## 2021-12-22 PROCEDURE — 2580000003 HC RX 258: Performed by: SPECIALIST

## 2021-12-22 PROCEDURE — 2709999900 HC NON-CHARGEABLE SUPPLY: Performed by: SPECIALIST

## 2021-12-22 RX ORDER — PROPOFOL 10 MG/ML
INJECTION, EMULSION INTRAVENOUS PRN
Status: DISCONTINUED | OUTPATIENT
Start: 2021-12-22 | End: 2021-12-22 | Stop reason: SDUPTHER

## 2021-12-22 RX ORDER — SIMETHICONE 20 MG/.3ML
EMULSION ORAL PRN
Status: DISCONTINUED | OUTPATIENT
Start: 2021-12-22 | End: 2021-12-22 | Stop reason: ALTCHOICE

## 2021-12-22 RX ORDER — SODIUM CHLORIDE 9 MG/ML
INJECTION, SOLUTION INTRAVENOUS
Status: COMPLETED
Start: 2021-12-22 | End: 2021-12-22

## 2021-12-22 RX ORDER — MAGNESIUM HYDROXIDE 1200 MG/15ML
LIQUID ORAL PRN
Status: DISCONTINUED | OUTPATIENT
Start: 2021-12-22 | End: 2021-12-22 | Stop reason: ALTCHOICE

## 2021-12-22 RX ORDER — LIDOCAINE HYDROCHLORIDE 20 MG/ML
INJECTION, SOLUTION INFILTRATION; PERINEURAL PRN
Status: DISCONTINUED | OUTPATIENT
Start: 2021-12-22 | End: 2021-12-22 | Stop reason: SDUPTHER

## 2021-12-22 RX ORDER — SODIUM CHLORIDE 9 MG/ML
INJECTION, SOLUTION INTRAVENOUS CONTINUOUS
Status: DISCONTINUED | OUTPATIENT
Start: 2021-12-22 | End: 2021-12-22 | Stop reason: HOSPADM

## 2021-12-22 RX ADMIN — PROPOFOL 100 MG: 10 INJECTION, EMULSION INTRAVENOUS at 07:35

## 2021-12-22 RX ADMIN — PROPOFOL 50 MG: 10 INJECTION, EMULSION INTRAVENOUS at 07:46

## 2021-12-22 RX ADMIN — PROPOFOL 50 MG: 10 INJECTION, EMULSION INTRAVENOUS at 07:39

## 2021-12-22 RX ADMIN — SODIUM CHLORIDE: 9 INJECTION, SOLUTION INTRAVENOUS at 07:13

## 2021-12-22 RX ADMIN — PROPOFOL 50 MG: 10 INJECTION, EMULSION INTRAVENOUS at 07:43

## 2021-12-22 RX ADMIN — PROPOFOL 150 MG: 10 INJECTION, EMULSION INTRAVENOUS at 07:31

## 2021-12-22 RX ADMIN — LIDOCAINE HYDROCHLORIDE 50 MG: 20 INJECTION, SOLUTION INFILTRATION; PERINEURAL at 07:31

## 2021-12-22 ASSESSMENT — PAIN - FUNCTIONAL ASSESSMENT: PAIN_FUNCTIONAL_ASSESSMENT: 0-10

## 2021-12-22 NOTE — H&P
Patient Name: Amaryllis Jeans.  : 1970  MRN: [id number]  DATE: 21      ENDOSCOPY  History and Physical    Procedure:    [] Diagnostic Colonoscopy       [x] Screening Colonoscopy  [] EGD      [] ERCP      [] EUS       [] Other    [x] Previous office notes/History and Physical reviewed from the patients chart. Please see EMR for further details of HPI. I have examined the patient's status immediately prior to the procedure and:      Indications/HPI:    []Abdominal Pain  []Cancer- GI/Lung  []Fhx of colon CA/polyps  []History of Polyps  []Leons   []Melena  []Abnormal Imaging  []Dysphagia    []Persistent Pneumonia  []Anemia  []Food Impaction  []History of Polyps  []GI Bleed  []Pulmonary nodule/Mass  []Change in bowel habits []Heartburn/Reflux  []Rectal Bleed (BRBPR)  []Chest Pain - Non Cardiac []Heme (+) Stoo  l[]Ulcers  []Constipation  []Hemoptysis   []Varices  []Diarrhea  []Hypoxemia  []Nausea/Vomiting  []Screening   []Crohns/Colitis  []Other:     Anesthesia:   [x] MAC [] Moderate Sedation   [] General   [] None     ROS: 12 pt Review of Symptoms was negative unless mentioned above    Medications:   Prior to Admission medications    Medication Sig Start Date End Date Taking? Authorizing Provider   loratadine (CLARITIN) 10 MG capsule Take 10 mg by mouth daily     Historical Provider, MD   Cholecalciferol (VITAMIN D3) 125 MCG (5000 UT) TABS Take by mouth    Historical Provider, MD   Multiple Vitamins-Minerals (THERAPEUTIC MULTIVITAMIN-MINERALS) tablet Take 1 tablet by mouth daily    Historical Provider, MD   vitamin C (ASCORBIC ACID) 500 MG tablet Take 500 mg by mouth daily    Historical Provider, MD       Allergies:    Allergies   Allergen Reactions    Pcn [Penicillins] Nausea And Vomiting        History of allergic reaction to anesthesia:  No    Past Medical History:  Past Medical History:   Diagnosis Date    Allergic rhinitis     Mild anemia     Mild hyperlipidemia        Past Surgical History:  Past Surgical History:   Procedure Laterality Date    WISDOM TOOTH EXTRACTION         Social History:  Social History     Tobacco Use    Smoking status: Never Smoker    Smokeless tobacco: Never Used   Vaping Use    Vaping Use: Never used   Substance Use Topics    Alcohol use: No    Drug use: No       Vital Signs:   Vitals:    12/22/21 0657   BP: (!) 146/81   Pulse: 79   Resp: 18   Temp: 98.2 °F (36.8 °C)   SpO2: 100%        Physical Exam:  Cardiac:  [x]WNL  []Comments:  Pulmonary:  [x]WNL   []Comments:   Neuro/Mental Status:  [x]WNL  []Comments:  Abdominal:  [x]WNL    []Comments:  Other:   []WNL  []Comments:    Informed Consent:  The risks and benefits of the procedure have been discussed with either the patient or if they cannot consent, their representative. Assessment:  Patient examined and appropriate for planned sedation and procedure. Plan:  Proceed with planned sedation and procedure as above.     Vanita Koenig MD  7:14 AM

## 2021-12-22 NOTE — ANESTHESIA PRE PROCEDURE
Department of Anesthesiology  Preprocedure Note       Name:  Amaryllis Jeans.   Age:  49 y.o.  :  1970                                          MRN:  [id number]         Date:  2021      Surgeon: Keon Mcmahan):  Jay Jay Lau MD    Procedure: Procedure(s):  COLORECTAL CANCER SCREENING, NOT HIGH RISK    Medications prior to admission:   Prior to Admission medications    Medication Sig Start Date End Date Taking? Authorizing Provider   loratadine (CLARITIN) 10 MG capsule Take 10 mg by mouth daily     Historical Provider, MD   Cholecalciferol (VITAMIN D3) 125 MCG (5000 UT) TABS Take by mouth    Historical Provider, MD   Multiple Vitamins-Minerals (THERAPEUTIC MULTIVITAMIN-MINERALS) tablet Take 1 tablet by mouth daily    Historical Provider, MD   vitamin C (ASCORBIC ACID) 500 MG tablet Take 500 mg by mouth daily    Historical Provider, MD       Current medications:    Current Facility-Administered Medications   Medication Dose Route Frequency Provider Last Rate Last Admin    0.9 % sodium chloride infusion   IntraVENous Continuous Jay Jay Lau  mL/hr at 21 0713 New Bag at 21 0713       Allergies: Allergies   Allergen Reactions    Pcn [Penicillins] Nausea And Vomiting       Problem List:    Patient Active Problem List   Diagnosis Code    Allergic rhinitis J30.9    Mild anemia D64.9    Mild hyperlipidemia E78.5    Bilateral presbyopia H52.4    Conductive hearing loss of right ear with unrestricted hearing of left ear H90.11       Past Medical History:        Diagnosis Date    Allergic rhinitis     Mild anemia     Mild hyperlipidemia        Past Surgical History:        Procedure Laterality Date    WISDOM TOOTH EXTRACTION         Social History:    Social History     Tobacco Use    Smoking status: Never Smoker    Smokeless tobacco: Never Used   Substance Use Topics    Alcohol use:  No                                Counseling given: Not Answered      Vital Signs (Current): Vitals:    12/22/21 0657   BP: (!) 146/81   Pulse: 79   Resp: 18   Temp: 36.8 °C (98.2 °F)   SpO2: 100%   Weight: 212 lb (96.2 kg)   Height: 6' 1\" (1.854 m)                                              BP Readings from Last 3 Encounters:   12/22/21 (!) 146/81   10/15/21 129/76   10/15/21 128/70       NPO Status: Time of last liquid consumption: 1045                        Time of last solid consumption: 1030                        Date of last liquid consumption: 12/21/21                        Date of last solid food consumption: 12/20/21    BMI:   Wt Readings from Last 3 Encounters:   12/22/21 212 lb (96.2 kg)   10/15/21 209 lb (94.8 kg)   10/15/21 209 lb (94.8 kg)     Body mass index is 27.97 kg/m². CBC:   Lab Results   Component Value Date    WBC 4.7 08/27/2021    RBC 4.08 08/27/2021    HGB 13.3 08/27/2021    HCT 40.3 08/27/2021    MCV 98.6 08/27/2021    RDW 12.8 08/27/2021     08/27/2021       CMP:   Lab Results   Component Value Date     08/27/2021    K 3.9 08/27/2021     08/27/2021    CO2 26 08/27/2021    BUN 20 08/27/2021    CREATININE 1.22 08/27/2021    GFRAA >60.0 08/27/2021    LABGLOM >60.0 08/27/2021    GLUCOSE 78 08/27/2021    PROT 6.8 08/27/2021    CALCIUM 9.4 08/27/2021    BILITOT 0.7 08/27/2021    ALKPHOS 57 08/27/2021    AST 17 08/27/2021    ALT 19 08/27/2021       POC Tests: No results for input(s): POCGLU, POCNA, POCK, POCCL, POCBUN, POCHEMO, POCHCT in the last 72 hours.     Coags: No results found for: PROTIME, INR, APTT    HCG (If Applicable): No results found for: PREGTESTUR, PREGSERUM, HCG, HCGQUANT     ABGs: No results found for: PHART, PO2ART, HVN0KPQ, ACP1ZLZ, BEART, R8LRCCIL     Type & Screen (If Applicable):  No results found for: LABABO, LABRH    Drug/Infectious Status (If Applicable):  No results found for: HIV, HEPCAB    COVID-19 Screening (If Applicable): No results found for: COVID19        Anesthesia Evaluation    Airway: Mallampati: II        Dental: normal exam         Pulmonary:Negative Pulmonary ROS and normal exam                               Cardiovascular:Negative CV ROS    (+) hyperlipidemia                  Neuro/Psych:   Negative Neuro/Psych ROS              GI/Hepatic/Renal: Neg GI/Hepatic/Renal ROS            Endo/Other: Negative Endo/Other ROS                    Abdominal:       Abdomen: soft. Vascular: negative vascular ROS. Other Findings:             Anesthesia Plan      MAC     ASA 1       Induction: intravenous. Anesthetic plan and risks discussed with patient. Plan discussed with attending.                   Ana Douglas, MIGEL - CRNA   12/22/2021

## 2021-12-22 NOTE — ANESTHESIA POSTPROCEDURE EVALUATION
Department of Anesthesiology  Postprocedure Note    Patient: Rocio Daen Sr. MRN: [id number]  YOB: 1970  Date of evaluation: 12/22/2021  Time:  7:50 AM     Procedure Summary     Date: 12/22/21 Room / Location: [address] [place] [address]    Anesthesia Start: 2740 Anesthesia Stop:     Procedure: COLORECTAL CANCER SCREENING, NOT HIGH RISK WITH POLYPECTOMY (N/A ) Diagnosis: (Screening)    Surgeons: Gilmar Moncada MD Responsible Provider: MIGEL Hodge CRNA    Anesthesia Type: MAC ASA Status: 1          Anesthesia Type: No value filed. Andres Phase I: Andres Score: 10    Andres Phase II:      Last vitals: Reviewed and per EMR flowsheets.        Anesthesia Post Evaluation    Patient location during evaluation: PACU  Patient participation: complete - patient participated  Level of consciousness: awake and alert  Pain score: 0  Airway patency: patent  Nausea & Vomiting: no nausea  Complications: no  Cardiovascular status: hemodynamically stable  Respiratory status: acceptable  Hydration status: stable

## 2022-01-31 ENCOUNTER — OFFICE VISIT (OUTPATIENT)
Dept: FAMILY MEDICINE CLINIC | Age: 52
End: 2022-01-31
Payer: COMMERCIAL

## 2022-01-31 VITALS
SYSTOLIC BLOOD PRESSURE: 130 MMHG | OXYGEN SATURATION: 99 % | TEMPERATURE: 97.4 F | HEART RATE: 78 BPM | DIASTOLIC BLOOD PRESSURE: 88 MMHG | BODY MASS INDEX: 27.71 KG/M2 | WEIGHT: 210 LBS

## 2022-01-31 DIAGNOSIS — H61.23 BILATERAL IMPACTED CERUMEN: Primary | ICD-10-CM

## 2022-01-31 DIAGNOSIS — L20.9 ATOPIC DERMATITIS, UNSPECIFIED TYPE: ICD-10-CM

## 2022-01-31 PROCEDURE — 69210 REMOVE IMPACTED EAR WAX UNI: CPT

## 2022-01-31 PROCEDURE — 99213 OFFICE O/P EST LOW 20 MIN: CPT

## 2022-01-31 RX ORDER — DIAPER,BRIEF,INFANT-TODD,DISP
EACH MISCELLANEOUS
Qty: 1.5 G | Refills: 0 | Status: SHIPPED | OUTPATIENT
Start: 2022-01-31

## 2022-01-31 ASSESSMENT — ENCOUNTER SYMPTOMS
SORE THROAT: 0
FACIAL SWELLING: 0
DIARRHEA: 0
COUGH: 0
CHEST TIGHTNESS: 0
SHORTNESS OF BREATH: 0
WHEEZING: 0

## 2022-01-31 NOTE — PROGRESS NOTES
12/22/21 212 lb (96.2 kg)     Current Outpatient Medications   Medication Sig Dispense Refill    hydrocortisone 1 % cream Apply topically 2 times daily. 1.5 g 0    loratadine (CLARITIN) 10 MG capsule Take 10 mg by mouth daily       Cholecalciferol (VITAMIN D3) 125 MCG (5000 UT) TABS Take by mouth      Multiple Vitamins-Minerals (THERAPEUTIC MULTIVITAMIN-MINERALS) tablet Take 1 tablet by mouth daily      vitamin C (ASCORBIC ACID) 500 MG tablet Take 500 mg by mouth daily       No current facility-administered medications for this visit. Physical Exam  Vitals reviewed. Constitutional:       General: He is not in acute distress. Appearance: Normal appearance. HENT:      Head: Normocephalic and atraumatic. Right Ear: There is impacted cerumen. Left Ear: There is impacted cerumen. Eyes:      General: Lids are normal.   Cardiovascular:      Rate and Rhythm: Normal rate and regular rhythm. Pulmonary:      Effort: Pulmonary effort is normal. No respiratory distress. Breath sounds: Normal air entry. Musculoskeletal:      Cervical back: Normal range of motion. Skin:     General: Skin is warm and dry. Findings: Rash present. Rash is macular and papular. Comments: Diffuse maculopapular rash across abdomen. Scabbing noted over some of the lesions. Neurological:      Mental Status: He is alert and oriented to person, place, and time. Psychiatric:         Mood and Affect: Mood normal.         Behavior: Behavior is cooperative. Assessment & Plan    Diagnosis Orders   1. Bilateral impacted cerumen  83901 - [place] REMOVE IMPACTED EAR WAX   2.  Atopic dermatitis, unspecified type  hydrocortisone 1 % cream     Orders Placed This Encounter   Procedures    63526 - [place] REMOVE IMPACTED EAR WAX    -cerumen removed bilaterally using debrox and water flush   -external canal clear bilaterally with no erythema   -TM normal bilaterally    Orders Placed This Encounter   Medications  hydrocortisone 1 % cream     Sig: Apply topically 2 times daily. Dispense:  1.5 g     Refill:  0     Return if symptoms worsen or fail to improve, for follow up with PCP.    -Discussed red flags for when to go to the ER  -Avoid allergy triggers to prevent further rash   -Aquaphor or Aveeno lotion as needed for dry rash    Reviewed with the patient: current clinical status,medications, activities and diet. Side effects, adverse effects of themedication prescribed today, as well as treatment plan/ rationale and result expectations have been discussed with the patient who expresses understanding and desires to proceed. Close follow up to evaluate treatment results and for coordination of care. I have reviewed the patient's medical history in detail and updated the computerized patient record.     MIGEL Bliss - SAVANNAH

## 2022-05-06 ENCOUNTER — OFFICE VISIT (OUTPATIENT)
Dept: FAMILY MEDICINE CLINIC | Age: 52
End: 2022-05-06
Payer: COMMERCIAL

## 2022-05-06 VITALS — HEART RATE: 65 BPM | OXYGEN SATURATION: 100 % | RESPIRATION RATE: 16 BRPM

## 2022-05-06 DIAGNOSIS — H61.23 BILATERAL IMPACTED CERUMEN: Primary | ICD-10-CM

## 2022-05-06 PROCEDURE — 99213 OFFICE O/P EST LOW 20 MIN: CPT

## 2022-05-06 PROCEDURE — 1036F TOBACCO NON-USER: CPT

## 2022-05-06 PROCEDURE — G8419 CALC BMI OUT NRM PARAM NOF/U: HCPCS

## 2022-05-06 PROCEDURE — G8427 DOCREV CUR MEDS BY ELIG CLIN: HCPCS

## 2022-05-06 PROCEDURE — 69209 REMOVE IMPACTED EAR WAX UNI: CPT

## 2022-05-06 PROCEDURE — 3017F COLORECTAL CA SCREEN DOC REV: CPT

## 2022-05-06 ASSESSMENT — ENCOUNTER SYMPTOMS
SINUS PRESSURE: 0
COLOR CHANGE: 0
RHINORRHEA: 0
SORE THROAT: 0
EYES NEGATIVE: 1
FACIAL SWELLING: 0
SINUS PAIN: 0
RESPIRATORY NEGATIVE: 1

## 2022-05-06 NOTE — PATIENT INSTRUCTIONS

## 2022-05-06 NOTE — PROGRESS NOTES
[facility] Encounter        ASSESSMENT/PLAN     Judeen Klinefelter is a 49 y.o. male who presents with:  Reporting muffled hearing in bilateral ears gradually worsening over the last couple weeks. Patient reports he has chronic cerumen impactions. Pt nontoxic appearance and VS are stable. On examination bilateral ears are impacted with cerumen. Revisualization after flushing, ear canals are clear TMs intact no erythremia. 1. Bilateral impacted cerumen      Bilateral ears flushed with warm saline and peroxide. Large amounts of cerumen removed from both ears. Patient advised he can use mineral oil drops 2 drops in each ear once or twice weekly to help reduce cerumen buildup. PATIENT REFERRED TO:  Return if symptoms worsen or fail to improve. DISCHARGE MEDICATIONS:  New Prescriptions    No medications on file     Cannot display discharge medications since this is not an admission. Karen Palma, APRN - CNP    CHIEF COMPLAINT     No chief complaint on file. SUBJECTIVE/REVIEW OF SYSTEMS     Review of Systems   Constitutional: Negative for appetite change, chills, fatigue and fever. HENT: Positive for hearing loss. Negative for congestion, dental problem, ear discharge, ear pain, facial swelling, rhinorrhea, sinus pressure, sinus pain, sneezing and sore throat. Eyes: Negative. Respiratory: Negative. Cardiovascular: Negative. Skin: Negative for color change, pallor, rash and wound. Neurological: Negative for dizziness and headaches. Hematological: Negative for adenopathy. Psychiatric/Behavioral: Negative. OBJECTIVE/PHYSICAL EXAM     Physical Exam  Constitutional:       General: He is not in acute distress. Appearance: Normal appearance. He is not ill-appearing, toxic-appearing or diaphoretic. HENT:      Head: Normocephalic.       Right Ear: Hearing, tympanic membrane, ear canal and external ear normal. No decreased hearing noted. No drainage or swelling. There is impacted cerumen. No foreign body. No mastoid tenderness. Tympanic membrane is not erythematous, retracted or bulging. Left Ear: Hearing, tympanic membrane, ear canal and external ear normal. No decreased hearing noted. No drainage or swelling. There is impacted cerumen. No foreign body. No mastoid tenderness. Tympanic membrane is not erythematous, retracted or bulging. Nose: Nose normal. No congestion or rhinorrhea. Mouth/Throat:      Mouth: Mucous membranes are moist.      Pharynx: Oropharynx is clear. Eyes:      General:         Right eye: No discharge. Left eye: No discharge. Conjunctiva/sclera: Conjunctivae normal.   Cardiovascular:      Rate and Rhythm: Normal rate. Pulses: Normal pulses. Pulmonary:      Effort: Pulmonary effort is normal. No respiratory distress. Musculoskeletal:      Cervical back: Normal range of motion and neck supple. No rigidity or tenderness. Lymphadenopathy:      Cervical: No cervical adenopathy. Skin:     General: Skin is warm and dry. Capillary Refill: Capillary refill takes less than 2 seconds. Neurological:      Mental Status: He is alert and oriented to person, place, and time. Mental status is at baseline. Psychiatric:         Mood and Affect: Mood normal.         VITALS   ,  ,  , Pulse: 65, Resp: 16, SpO2: 100 %      PAST MEDICAL HISTORY         Diagnosis Date    Allergic rhinitis     Mild anemia     Mild hyperlipidemia      SURGICAL HISTORY     Patient  has a past surgical history that includes [place] tooth extraction and Colonoscopy (N/A, 12/22/2021). CURRENT MEDICATIONS       Previous Medications    CHOLECALCIFEROL (VITAMIN D3) 125 MCG (5000 UT) TABS    Take by mouth    HYDROCORTISONE 1 % CREAM    Apply topically 2 times daily.     LORATADINE (CLARITIN) 10 MG CAPSULE    Take 10 mg by mouth daily     MULTIPLE VITAMINS-MINERALS (THERAPEUTIC MULTIVITAMIN-MINERALS) TABLET Take 1 tablet by mouth daily    VITAMIN C (ASCORBIC ACID) 500 MG TABLET    Take 500 mg by mouth daily     ALLERGIES     Patient is is allergic to pcn [penicillins]. FAMILY HISTORY     Patient'sfamily history includes Emphysema in his father. HISTORY     Patient  reports that he has never smoked. He has never used smokeless tobacco. He reports that he does not drink alcohol and does not use drugs. READY CARE COURSE     Orders Placed This Encounter   Procedures    NE REMOVAL IMPACTED CERUMEN IRRIGATION/LVG UNILAT        Labs:  No results found for this visit on 05/06/22. IMAGING:  No orders to display     Scheduled Meds:  Continuous Infusions:  PRN Meds:.

## 2022-07-12 ENCOUNTER — OFFICE VISIT (OUTPATIENT)
Dept: FAMILY MEDICINE CLINIC | Age: 52
End: 2022-07-12
Payer: COMMERCIAL

## 2022-07-12 VITALS
OXYGEN SATURATION: 98 % | TEMPERATURE: 97.4 F | SYSTOLIC BLOOD PRESSURE: 110 MMHG | DIASTOLIC BLOOD PRESSURE: 84 MMHG | BODY MASS INDEX: 27.83 KG/M2 | HEIGHT: 73 IN | WEIGHT: 210 LBS | HEART RATE: 68 BPM

## 2022-07-12 DIAGNOSIS — H61.23 CERUMEN DEBRIS ON TYMPANIC MEMBRANE OF BOTH EARS: Primary | ICD-10-CM

## 2022-07-12 PROCEDURE — G8427 DOCREV CUR MEDS BY ELIG CLIN: HCPCS

## 2022-07-12 PROCEDURE — 1036F TOBACCO NON-USER: CPT

## 2022-07-12 PROCEDURE — 3017F COLORECTAL CA SCREEN DOC REV: CPT

## 2022-07-12 PROCEDURE — 99212 OFFICE O/P EST SF 10 MIN: CPT

## 2022-07-12 PROCEDURE — G8419 CALC BMI OUT NRM PARAM NOF/U: HCPCS

## 2022-07-12 SDOH — ECONOMIC STABILITY: FOOD INSECURITY: WITHIN THE PAST 12 MONTHS, THE FOOD YOU BOUGHT JUST DIDN'T LAST AND YOU DIDN'T HAVE MONEY TO GET MORE.: NEVER TRUE

## 2022-07-12 SDOH — ECONOMIC STABILITY: FOOD INSECURITY: WITHIN THE PAST 12 MONTHS, YOU WORRIED THAT YOUR FOOD WOULD RUN OUT BEFORE YOU GOT MONEY TO BUY MORE.: NEVER TRUE

## 2022-07-12 ASSESSMENT — ENCOUNTER SYMPTOMS
ABDOMINAL PAIN: 0
COUGH: 0
EYE REDNESS: 0
NAUSEA: 0
COLOR CHANGE: 0
EYE ITCHING: 0
EYE DISCHARGE: 0
ABDOMINAL DISTENTION: 0
CONSTIPATION: 0
RHINORRHEA: 0
DIARRHEA: 0
BACK PAIN: 0
SHORTNESS OF BREATH: 0
VOMITING: 0
SORE THROAT: 0

## 2022-07-12 ASSESSMENT — SOCIAL DETERMINANTS OF HEALTH (SDOH): HOW HARD IS IT FOR YOU TO PAY FOR THE VERY BASICS LIKE FOOD, HOUSING, MEDICAL CARE, AND HEATING?: NOT HARD AT ALL

## 2022-07-12 ASSESSMENT — PATIENT HEALTH QUESTIONNAIRE - PHQ9
2. FEELING DOWN, DEPRESSED OR HOPELESS: 0
SUM OF ALL RESPONSES TO PHQ QUESTIONS 1-9: 0
SUM OF ALL RESPONSES TO PHQ QUESTIONS 1-9: 0
SUM OF ALL RESPONSES TO PHQ9 QUESTIONS 1 & 2: 0
1. LITTLE INTEREST OR PLEASURE IN DOING THINGS: 0
SUM OF ALL RESPONSES TO PHQ QUESTIONS 1-9: 0
SUM OF ALL RESPONSES TO PHQ QUESTIONS 1-9: 0

## 2022-07-12 NOTE — PROGRESS NOTES
[facility]0 [address] Encounter    SUBJECTIVE    CHIEF COMPLAINT:   Chief Complaint   Patient presents with    Cerumen Impaction     Ears feel impacted, no pain or drainage       HPI  Sherie Antonio is a 49 y.o. male who presents to the walk-in clinic today for:   Requesting ears to be flushed. Comes every so often because he feels as if his ears get backed up. No pain or fevers. Review of Systems   Constitutional: Negative for appetite change, chills, fatigue and fever. HENT: Positive for ear discharge. Negative for congestion, ear pain, rhinorrhea and sore throat. Eyes: Negative for discharge, redness, itching and visual disturbance. Respiratory: Negative for cough and shortness of breath. Cardiovascular: Negative for chest pain and palpitations. Gastrointestinal: Negative for abdominal distention, abdominal pain, constipation, diarrhea, nausea and vomiting. Genitourinary: Negative for difficulty urinating, dysuria, flank pain, frequency, hematuria and urgency. Musculoskeletal: Negative for arthralgias, back pain, joint swelling, myalgias and neck pain. Skin: Negative for color change, rash and wound. Neurological: Negative for dizziness, weakness, light-headedness, numbness and headaches.      Past Medical History:   Diagnosis Date    Allergic rhinitis     Mild anemia     Mild hyperlipidemia      Family History   Problem Relation Age of Onset    Emphysema Father     Colon Cancer Neg Hx      Social History     Socioeconomic History    Marital status:      Spouse name: Not on file    Number of children: Not on file    Years of education: Not on file    Highest education level: Not on file   Occupational History    Not on file   Tobacco Use    Smoking status: Never Smoker    Smokeless tobacco: Never Used   Vaping Use    Vaping Use: Never used   Substance and Sexual Activity    Alcohol use: No    Drug use: No    Sexual activity: Yes Partners: Female   Other Topics Concern    Not on file   Social History Narrative    Not on file     Social Determinants of Health     Financial Resource Strain: Low Risk     Difficulty of Paying Living Expenses: Not hard at all   Food Insecurity: No Food Insecurity    Worried About Running Out of Food in the Last Year: Never true    [address] in the Last Year: Never true   Transportation Needs: No Transportation Needs    Lack of Transportation (Medical): No    Lack of Transportation (Non-Medical): No   Physical Activity:     Days of Exercise per Week: Not on file    Minutes of Exercise per Session: Not on file   Stress:     Feeling of Stress : Not on file   Social Connections:     Frequency of Communication with Friends and Family: Not on file    Frequency of Social Gatherings with Friends and Family: Not on file    Attends Protestant Services: Not on file    Active Member of [address] [place] [address] [employer] or Organizations: Not on file    Attends Club or Organization Meetings: Not on file    Marital Status: Not on file   Intimate Partner Violence:     Fear of Current or Ex-Partner: Not on file    Emotionally Abused: Not on file    Physically Abused: Not on file    Sexually Abused: Not on file   Housing Stability:     Unable to Pay for Housing in the Last Year: Not on file    Number of [place] in the Last Year: Not on file    Unstable Housing in the Last Year: Not on file       Allergies and medications have been reviewed. OBJECTIVE:  Vitals:  /84 (Site: Right Upper Arm, Position: Sitting, Cuff Size: Medium Adult)   Pulse 68   Temp 97.4 °F (36.3 °C) (Temporal)   Ht 6' 1\" (1.854 m)   Wt 210 lb (95.3 kg)   SpO2 98%   BMI 27.71 kg/m²     Physical Exam  Vitals reviewed. Constitutional:       General: He is not in acute distress. Appearance: Normal appearance. He is not ill-appearing. HENT:      Head: Normocephalic.       Right Ear: Tympanic membrane, ear canal and external ear normal. Left Ear: Tympanic membrane, ear canal and external ear normal.      Nose: Nose normal.      Mouth/Throat:      Mouth: Mucous membranes are moist.      Pharynx: Oropharynx is clear. Eyes:      Conjunctiva/sclera: Conjunctivae normal.      Pupils: Pupils are equal, round, and reactive to light. Cardiovascular:      Rate and Rhythm: Normal rate. Pulses: Normal pulses. Heart sounds: Normal heart sounds. No murmur heard. Pulmonary:      Effort: Pulmonary effort is normal. No respiratory distress. Breath sounds: Normal breath sounds. No wheezing or rales. Abdominal:      General: Bowel sounds are normal. There is no distension. Palpations: Abdomen is soft. There is no mass. Tenderness: There is no abdominal tenderness. There is no guarding or rebound. Musculoskeletal:         General: No deformity or signs of injury. Normal range of motion. Cervical back: Normal range of motion and neck supple. No tenderness. Lymphadenopathy:      Cervical: No cervical adenopathy. Skin:     General: Skin is warm and dry. Capillary Refill: Capillary refill takes less than 2 seconds. Findings: No erythema or rash. Neurological:      Mental Status: He is alert and oriented to person, place, and time. ASSESSMENT/PLAN:    1. Cerumen debris on tympanic membrane of both ears  - EAR CERUMEN REMOVAL performed by MA  - Pt reports feeling much better. Return if symptoms worsen or fail to improve. An electronic signature was used to authenticate this note.   MIGEL Elizabeth - CNP

## 2022-08-17 ENCOUNTER — TELEPHONE (OUTPATIENT)
Dept: FAMILY MEDICINE CLINIC | Age: 52
End: 2022-08-17

## 2022-08-17 DIAGNOSIS — Z12.5 PROSTATE CANCER SCREENING: ICD-10-CM

## 2022-08-17 DIAGNOSIS — Z00.00 ANNUAL PHYSICAL EXAM: Primary | ICD-10-CM

## 2022-08-17 NOTE — TELEPHONE ENCOUNTER
----- Message from Lizz Parker sent at 8/15/2022 12:33 PM EDT -----  Subject: Referral Request    Reason for referral request? Labs for appt  Provider patient wants to be referred to(if known):     Provider Phone Number(if known):     Additional Information for Provider?   ---------------------------------------------------------------------------  --------------  [phone] uMentioned    [phone]; OK to leave message on voicemail  ---------------------------------------------------------------------------  --------------

## 2022-08-18 ENCOUNTER — HOSPITAL ENCOUNTER (OUTPATIENT)
Dept: LAB | Age: 52
Discharge: HOME | End: 2022-08-18
Payer: COMMERCIAL

## 2022-08-18 DIAGNOSIS — Z00.00 ANNUAL PHYSICAL EXAM: ICD-10-CM

## 2022-08-18 DIAGNOSIS — Z12.5 PROSTATE CANCER SCREENING: ICD-10-CM

## 2022-08-18 LAB
ALBUMIN SERPL-MCNC: 4.5 G/DL (ref 3.5–4.6)
ALP BLD-CCNC: 56 U/L (ref 35–104)
ALT SERPL-CCNC: 13 U/L (ref 0–41)
ANION GAP SERPL CALCULATED.3IONS-SCNC: 11 MEQ/L (ref 9–15)
AST SERPL-CCNC: 15 U/L (ref 0–40)
BASOPHILS ABSOLUTE: 0 K/UL (ref 0–0.2)
BASOPHILS RELATIVE PERCENT: 0.2 %
BILIRUB SERPL-MCNC: 0.5 MG/DL (ref 0.2–0.7)
BUN BLDV-MCNC: 16 MG/DL (ref 6–20)
CALCIUM SERPL-MCNC: 9.5 MG/DL (ref 8.5–9.9)
CHLORIDE BLD-SCNC: 101 MEQ/L (ref 95–107)
CHOLESTEROL, FASTING: 199 MG/DL (ref 0–199)
CO2: 27 MEQ/L (ref 20–31)
CREAT SERPL-MCNC: 1.42 MG/DL (ref 0.7–1.2)
EOSINOPHILS ABSOLUTE: 0.1 K/UL (ref 0–0.7)
EOSINOPHILS RELATIVE PERCENT: 2 %
GFR AFRICAN AMERICAN: >60
GFR NON-AFRICAN AMERICAN: 52.6
GLOBULIN: 2.9 G/DL (ref 2.3–3.5)
GLUCOSE BLD-MCNC: 90 MG/DL (ref 70–99)
HCT VFR BLD CALC: 41 % (ref 42–52)
HDLC SERPL-MCNC: 65 MG/DL (ref 40–59)
HEMOGLOBIN: 13.7 G/DL (ref 14–18)
LDL CHOLESTEROL CALCULATED: 123 MG/DL (ref 0–129)
LYMPHOCYTES ABSOLUTE: 2.1 K/UL (ref 1–4.8)
LYMPHOCYTES RELATIVE PERCENT: 44.6 %
MCH RBC QN AUTO: 33.2 PG (ref 27–31.3)
MCHC RBC AUTO-ENTMCNC: 33.4 % (ref 33–37)
MCV RBC AUTO: 99.3 FL (ref 80–100)
MONOCYTES ABSOLUTE: 0.4 K/UL (ref 0.2–0.8)
MONOCYTES RELATIVE PERCENT: 9.5 %
NEUTROPHILS ABSOLUTE: 2.1 K/UL (ref 1.4–6.5)
NEUTROPHILS RELATIVE PERCENT: 43.7 %
PDW BLD-RTO: 12.5 % (ref 11.5–14.5)
PLATELET # BLD: 269 K/UL (ref 130–400)
POTASSIUM SERPL-SCNC: 4.4 MEQ/L (ref 3.4–4.9)
PROSTATE SPECIFIC ANTIGEN: 0.82 NG/ML (ref 0–4)
RBC # BLD: 4.13 M/UL (ref 4.7–6.1)
SODIUM BLD-SCNC: 139 MEQ/L (ref 135–144)
TOTAL PROTEIN: 7.4 G/DL (ref 6.3–8)
TRIGLYCERIDE, FASTING: 57 MG/DL (ref 0–150)
WBC # BLD: 4.7 K/UL (ref 4.8–10.8)

## 2022-08-18 PROCEDURE — 85025 COMPLETE CBC W/AUTO DIFF WBC: CPT

## 2022-08-18 PROCEDURE — 80061 LIPID PANEL: CPT

## 2022-08-18 PROCEDURE — G0103 PSA SCREENING: HCPCS

## 2022-08-18 PROCEDURE — 36415 COLL VENOUS BLD VENIPUNCTURE: CPT

## 2022-08-18 PROCEDURE — 80053 COMPREHEN METABOLIC PANEL: CPT

## 2022-08-22 DIAGNOSIS — N18.2 CKD (CHRONIC KIDNEY DISEASE) STAGE 2, GFR 60-89 ML/MIN: Primary | ICD-10-CM

## 2022-08-26 ENCOUNTER — TELEMEDICINE (OUTPATIENT)
Dept: FAMILY MEDICINE CLINIC | Age: 52
End: 2022-08-26
Payer: COMMERCIAL

## 2022-08-26 ENCOUNTER — HOSPITAL ENCOUNTER (OUTPATIENT)
Dept: LAB | Age: 52
Discharge: HOME | End: 2022-08-26
Payer: COMMERCIAL

## 2022-08-26 DIAGNOSIS — R51.9 LEFT TEMPORAL HEADACHE: Primary | ICD-10-CM

## 2022-08-26 DIAGNOSIS — R51.9 LEFT TEMPORAL HEADACHE: ICD-10-CM

## 2022-08-26 LAB
C-REACTIVE PROTEIN: <3 MG/L (ref 0–5)
SEDIMENTATION RATE, ERYTHROCYTE: 8 MM (ref 0–20)

## 2022-08-26 PROCEDURE — 86038 ANTINUCLEAR ANTIBODIES: CPT

## 2022-08-26 PROCEDURE — 3017F COLORECTAL CA SCREEN DOC REV: CPT | Performed by: FAMILY MEDICINE

## 2022-08-26 PROCEDURE — 86140 C-REACTIVE PROTEIN: CPT

## 2022-08-26 PROCEDURE — 84155 ASSAY OF PROTEIN SERUM: CPT

## 2022-08-26 PROCEDURE — 1036F TOBACCO NON-USER: CPT | Performed by: FAMILY MEDICINE

## 2022-08-26 PROCEDURE — 99214 OFFICE O/P EST MOD 30 MIN: CPT | Performed by: FAMILY MEDICINE

## 2022-08-26 PROCEDURE — 85652 RBC SED RATE AUTOMATED: CPT

## 2022-08-26 PROCEDURE — G8419 CALC BMI OUT NRM PARAM NOF/U: HCPCS | Performed by: FAMILY MEDICINE

## 2022-08-26 PROCEDURE — G8427 DOCREV CUR MEDS BY ELIG CLIN: HCPCS | Performed by: FAMILY MEDICINE

## 2022-08-26 PROCEDURE — 84165 PROTEIN E-PHORESIS SERUM: CPT

## 2022-08-26 PROCEDURE — 36415 COLL VENOUS BLD VENIPUNCTURE: CPT

## 2022-08-27 ASSESSMENT — ENCOUNTER SYMPTOMS
EYE PAIN: 0
SINUS COMPLAINT: 1
VOMITING: 0
NAUSEA: 0
EYE DISCHARGE: 0
EYE REDNESS: 0
DIARRHEA: 0
SINUS PAIN: 0
APNEA: 0
CONSTIPATION: 0
FACIAL SWELLING: 0
CHEST TIGHTNESS: 0
SHORTNESS OF BREATH: 0
RHINORRHEA: 0
PHOTOPHOBIA: 0
COUGH: 0
ABDOMINAL PAIN: 0
BLOOD IN STOOL: 0

## 2022-08-27 NOTE — PROGRESS NOTES
Jmi Nguyen Sr. (:  1970) is a Established patient, here for evaluation of the following:    Assessment & Plan   Below is the assessment and plan developed based on review of pertinent history, physical exam, labs, studies, and medications. 1. Left temporal headache  Due to tenderness on exam we will obtain CRP and ESR to rule out temporal arteritis. Patient does have concern for possible aneurysm therefore will obtain a CTA of the head in order to rule diagnosis out  -     Sedimentation Rate; Future  -     C-Reactive Protein; Future  -     ANGEL Screen With Reflex; Future  -     Electrophoresis Protein, Serum; Future  -     CTA HEAD W WO CONTRAST; Future    Return if symptoms worsen or fail to improve. Subjective   Sinus Problem  Associated symptoms include headaches. Pertinent negatives include no congestion, coughing or shortness of breath. Patient is a very pleasant 56-year-old male presents today due to a left-sided temporal headache which has been present for approximately 2 weeks. Patient states that pain is dull in nature and is mildly tender to touch just lateral to left eye. He denies any visual disturbance or pain with eye movement. He has taken Tylenol which is helped with his pain. He initially thought that this may have been secondary to a sinus infection. However, he denies any congestion, sore throat, sinus tenderness, ear pain, change in hearing ,sneezing or cough. Patient has no family history of tumor or aneurysm    Review of Systems   Constitutional:  Negative for activity change, appetite change and fatigue. HENT:  Negative for congestion, dental problem, drooling, ear discharge, facial swelling, mouth sores, nosebleeds, postnasal drip, rhinorrhea and sinus pain. Eyes:  Negative for photophobia, pain, discharge, redness and visual disturbance. Respiratory:  Negative for apnea, cough, chest tightness and shortness of breath.     Cardiovascular:  Negative for chest pain, palpitations and leg swelling. Gastrointestinal:  Negative for abdominal pain, blood in stool, constipation, diarrhea, nausea and vomiting. Musculoskeletal:  Negative for arthralgias. Neurological:  Positive for headaches. Negative for seizures. Psychiatric/Behavioral:  Negative for hallucinations and suicidal ideas.          Objective   Patient-Reported Vitals  BP Observations: No, remote/electronic monitoring device was not used or able to be verified  Patient-Reported Weight: 210 lb  Patient-Reported Height: 6 1       Physical Exam  HENT:      Head:       [INSTRUCTIONS:  \"[x]\" Indicates a positive item  \"[]\" Indicates a negative item  -- DELETE ALL ITEMS NOT EXAMINED]    Constitutional: [x] Appears well-developed and well-nourished [x] No apparent distress      [] Abnormal -     Mental status: [x] Alert and awake  [x] Oriented to person/place/time [x] Able to follow commands    [] Abnormal -     Eyes:   EOM    [x]  Normal    [] Abnormal -   Sclera  [x]  Normal    [] Abnormal -          Discharge [x]  None visible   [] Abnormal -     HENT: [x] Normocephalic, atraumatic  [] Abnormal -   [x] Mouth/Throat: Mucous membranes are moist    External Ears [x] Normal  [] Abnormal -    Neck: [x] No visualized mass [] Abnormal -     Pulmonary/Chest: [x] Respiratory effort normal   [x] No visualized signs of difficulty breathing or respiratory distress        [] Abnormal -      Musculoskeletal:   [x] Normal gait with no signs of ataxia         [x] Normal range of motion of neck        [] Abnormal -     Neurological:        [x] No Facial Asymmetry (Cranial nerve 7 motor function) (limited exam due to video visit)          [x] No gaze palsy        [] Abnormal -          Skin:        [x] No significant exanthematous lesions or discoloration noted on facial skin         [] Abnormal -            Psychiatric:       [x] Normal Affect [] Abnormal -        [x] No Hallucinations    Other pertinent observable physical exam findings:-               Nilam Razo, was evaluated through a synchronous (real-time) audio-video encounter. The patient (or guardian if applicable) is aware that this is a billable service, which includes applicable co-pays. This Virtual Visit was conducted with patient's (and/or legal guardian's) consent. The visit was conducted pursuant to the emergency declaration under the [address] [place] [address] and the [employer] Act. Patient identification was verified, and a caregiver was present when appropriate. The patient was located at Home: [address] [place] [address].    Provider was located at [facility] [address] [facility],  [address] Keyes MD

## 2022-08-29 LAB — ANA IGG, ELISA: NORMAL

## 2022-08-30 LAB
ALBUMIN SERPL-MCNC: 4.26 G/DL (ref 3.75–5.01)
ALPHA-1-GLOBULIN: 0.27 G/DL (ref 0.19–0.46)
ALPHA-2-GLOBULIN: 0.75 G/DL (ref 0.48–1.05)
BETA GLOBULIN: 0.86 G/DL (ref 0.48–1.1)
GAMMA GLOBULIN: 1.36 G/DL (ref 0.62–1.51)
PROTEIN ELECTROPHORESIS, SERUM: NORMAL
SPE/IFE INTERPRETATION: NORMAL
TOTAL PROTEIN: 7.5 G/DL (ref 6.3–8.2)

## 2022-09-02 ENCOUNTER — TELEPHONE (OUTPATIENT)
Dept: FAMILY MEDICINE CLINIC | Age: 52
End: 2022-09-02

## 2022-09-02 NOTE — TELEPHONE ENCOUNTER
----- Message from Alvertotung Bettsraheem sent at 9/1/2022  1:46 PM EDT -----  Subject: Message to Provider    QUESTIONS  Information for Provider? Patient returning call for test results.  Please   call patient when available  ---------------------------------------------------------------------------  --------------  Emigdio MARTIN  [phone]; OK to leave message on voicemail  ---------------------------------------------------------------------------  --------------  SCRIPT ANSWERS  undefined

## 2022-09-06 ENCOUNTER — HOSPITAL ENCOUNTER (OUTPATIENT)
Dept: CT IMAGING | Age: 52
Discharge: HOME | End: 2022-09-08
Payer: COMMERCIAL

## 2022-09-06 ENCOUNTER — OFFICE VISIT (OUTPATIENT)
Dept: FAMILY MEDICINE CLINIC | Age: 52
End: 2022-09-06
Payer: COMMERCIAL

## 2022-09-06 VITALS
DIASTOLIC BLOOD PRESSURE: 76 MMHG | SYSTOLIC BLOOD PRESSURE: 110 MMHG | BODY MASS INDEX: 28.18 KG/M2 | WEIGHT: 213.6 LBS | TEMPERATURE: 97.1 F | HEART RATE: 66 BPM | OXYGEN SATURATION: 98 %

## 2022-09-06 DIAGNOSIS — Z00.00 ANNUAL PHYSICAL EXAM: Primary | ICD-10-CM

## 2022-09-06 DIAGNOSIS — R51.9 LEFT TEMPORAL HEADACHE: ICD-10-CM

## 2022-09-06 DIAGNOSIS — N18.2 CKD (CHRONIC KIDNEY DISEASE) STAGE 2, GFR 60-89 ML/MIN: ICD-10-CM

## 2022-09-06 PROCEDURE — 99396 PREV VISIT EST AGE 40-64: CPT | Performed by: FAMILY MEDICINE

## 2022-09-06 PROCEDURE — 70496 CT ANGIOGRAPHY HEAD: CPT

## 2022-09-06 PROCEDURE — 6360000004 HC RX CONTRAST MEDICATION: Performed by: FAMILY MEDICINE

## 2022-09-06 RX ADMIN — IOPAMIDOL 100 ML: 755 INJECTION, SOLUTION INTRAVENOUS at 10:04

## 2022-09-06 ASSESSMENT — ENCOUNTER SYMPTOMS
SORE THROAT: 0
BACK PAIN: 0
ABDOMINAL DISTENTION: 0
DIARRHEA: 0
VOICE CHANGE: 0
CONSTIPATION: 0
NAUSEA: 0
RHINORRHEA: 0
ABDOMINAL PAIN: 0
SINUS PRESSURE: 0
ANAL BLEEDING: 0
BLOOD IN STOOL: 0
FACIAL SWELLING: 0
WHEEZING: 0
PHOTOPHOBIA: 0
SHORTNESS OF BREATH: 0
EYE ITCHING: 0
TROUBLE SWALLOWING: 0
EYE REDNESS: 0
EYE DISCHARGE: 0
CHEST TIGHTNESS: 0
APNEA: 0
COUGH: 0
CHOKING: 0
EYE PAIN: 0

## 2022-09-06 NOTE — PROGRESS NOTES
2022    Kahlil Greer Sr. (:  1970) is a 49 y.o. male, here for a preventive medicine evaluation. He states that he is doing well and that his temporal headache has resolved for the past 3 days. He otherwise feels well and denies any complaints at this time. Patient Active Problem List   Diagnosis    Allergic rhinitis    Mild anemia    Mild hyperlipidemia    Bilateral presbyopia    Conductive hearing loss of right ear with unrestricted hearing of left ear    Adenomatous polyp of colon    Adenomatous polyp of sigmoid colon       Review of Systems   Constitutional:  Negative for activity change, appetite change, chills, diaphoresis, fatigue, fever and unexpected weight change. HENT:  Negative for congestion, dental problem, ear discharge, ear pain, facial swelling, hearing loss, mouth sores, nosebleeds, postnasal drip, rhinorrhea, sinus pressure, sneezing, sore throat, tinnitus, trouble swallowing and voice change. Eyes:  Negative for photophobia, pain, discharge, redness, itching and visual disturbance. Respiratory:  Negative for apnea, cough, choking, chest tightness, shortness of breath and wheezing. Cardiovascular:  Negative for chest pain, palpitations and leg swelling. Gastrointestinal:  Negative for abdominal distention, abdominal pain, anal bleeding, blood in stool, constipation, diarrhea and nausea. Endocrine: Negative for cold intolerance, heat intolerance, polydipsia, polyphagia and polyuria. Genitourinary:  Negative for difficulty urinating, dysuria, flank pain, frequency, genital sores, hematuria, penile discharge, penile pain, scrotal swelling, testicular pain and urgency. Musculoskeletal:  Negative for arthralgias, back pain, gait problem, joint swelling, myalgias and neck pain. Skin:  Negative for rash. Neurological:  Negative for dizziness, seizures, syncope, weakness, light-headedness, numbness and headaches. Hematological:  Negative for adenopathy. Psychiatric/Behavioral:  Negative for behavioral problems, confusion, self-injury and suicidal ideas. The patient is not nervous/anxious and is not hyperactive. Prior to Visit Medications    Medication Sig Taking? Authorizing Provider   hydrocortisone 1 % cream Apply topically 2 times daily.  Yes MIGEL Toro NP   loratadine (CLARITIN) 10 MG capsule Take 10 mg by mouth daily  Yes Historical Provider, MD   Cholecalciferol (VITAMIN D3) 125 MCG (5000 UT) TABS Take by mouth Yes Historical Provider, MD   Multiple Vitamins-Minerals (THERAPEUTIC MULTIVITAMIN-MINERALS) tablet Take 1 tablet by mouth daily Yes Historical Provider, MD   vitamin C (ASCORBIC ACID) 500 MG tablet Take 500 mg by mouth daily Yes Historical Provider, MD        Allergies   Allergen Reactions    Pcn [Penicillins] Nausea And Vomiting       Past Medical History:   Diagnosis Date    Allergic rhinitis     Mild anemia     Mild hyperlipidemia        Past Surgical History:   Procedure Laterality Date    COLONOSCOPY N/A 12/22/2021    COLORECTAL CANCER SCREENING, NOT HIGH RISK WITH POLYPECTOMY performed by Minerva Dunn MD at [place] [address] History     Socioeconomic History    Marital status:      Spouse name: Not on file    Number of children: Not on file    Years of education: Not on file    Highest education level: Not on file   Occupational History    Not on file   Tobacco Use    Smoking status: Never    Smokeless tobacco: Never   Vaping Use    Vaping Use: Never used   Substance and Sexual Activity    Alcohol use: No    Drug use: No    Sexual activity: Yes     Partners: Female   Other Topics Concern    Not on file   Social History Narrative    Not on file     Social Determinants of Health     Financial Resource Strain: Low Risk     Difficulty of Paying Living Expenses: Not hard at all   Food Insecurity: No Food Insecurity    Worried About [address] in the Last Year: Never true Ran Out of Food in the Last Year: Never true   Transportation Needs: No Transportation Needs    Lack of Transportation (Medical): No    Lack of Transportation (Non-Medical): No   Physical Activity: Not on file   Stress: Not on file   Social Connections: Not on file   Intimate Partner Violence: Not on file   Housing Stability: Not on file        Family History   Problem Relation Age of Onset    Emphysema Father     Colon Cancer Neg Hx        ADVANCE DIRECTIVE: N, <no information>    Vitals:    09/06/22 0803   BP: 110/76   Pulse: 66   Temp: 97.1 °F (36.2 °C)   TempSrc: Infrared   SpO2: 98%   Weight: 213 lb 9.6 oz (96.9 kg)     Estimated body mass index is 28.18 kg/m² as calculated from the following:    Height as of 7/12/22: 6' 1\" (1.854 m). Weight as of this encounter: 213 lb 9.6 oz (96.9 kg). Physical Exam  Vitals reviewed. Constitutional:       General: He is not in acute distress. Appearance: Normal appearance. He is well-developed and normal weight. He is not diaphoretic. HENT:      Head: Normocephalic and atraumatic. Right Ear: Tympanic membrane, ear canal and external ear normal.      Left Ear: Tympanic membrane, ear canal and external ear normal.      Ears:      Comments: Clear after ear irrigation     Nose: Nose normal.      Mouth/Throat:      Mouth: Mucous membranes are moist.      Pharynx: Oropharynx is clear. No oropharyngeal exudate. Eyes:      General: No scleral icterus. Right eye: No discharge. Left eye: No discharge. Extraocular Movements: Extraocular movements intact. Conjunctiva/sclera: Conjunctivae normal.      Pupils: Pupils are equal, round, and reactive to light. Neck:      Thyroid: No thyromegaly. Vascular: No carotid bruit or JVD. Cardiovascular:      Rate and Rhythm: Normal rate and regular rhythm. Pulses: Normal pulses. Heart sounds: Normal heart sounds. No murmur heard. No friction rub. No gallop.    Pulmonary: Effort: Pulmonary effort is normal. No respiratory distress. Breath sounds: Normal breath sounds. No stridor. No wheezing or rales. Chest:      Chest wall: No tenderness. Abdominal:      General: Abdomen is flat. Bowel sounds are normal. There is no distension. Palpations: Abdomen is soft. There is no mass. Tenderness: There is no abdominal tenderness. There is no right CVA tenderness, left CVA tenderness, guarding or rebound. Musculoskeletal:         General: No swelling, tenderness or deformity. Normal range of motion. Cervical back: Normal range of motion and neck supple. Lymphadenopathy:      Cervical: No cervical adenopathy. Skin:     General: Skin is warm and dry. Coloration: Skin is not pale. Findings: No erythema or rash. Neurological:      General: No focal deficit present. Mental Status: He is alert and oriented to person, place, and time. Mental status is at baseline. Cranial Nerves: No cranial nerve deficit. Motor: No abnormal muscle tone. Coordination: Coordination normal.      Deep Tendon Reflexes: Reflexes are normal and symmetric. Reflexes normal.   Psychiatric:         Behavior: Behavior normal.         Thought Content: Thought content normal.         Judgment: Judgment normal.       No flowsheet data found.     Lab Results   Component Value Date/Time    CHOL 189 08/27/2021 07:10 AM    CHOL 175 08/04/2020 10:40 AM    CHOL 180 08/23/2019 08:48 AM    CHOLFAST 199 08/18/2022 07:33 AM    TRIG 44 08/27/2021 07:10 AM    TRIG 58 08/04/2020 10:40 AM    TRIG 62 08/23/2019 08:48 AM    TRIGLYCFAST 57 08/18/2022 07:33 AM    HDL 65 08/18/2022 07:33 AM    HDL 61 08/27/2021 07:10 AM    HDL 58 08/04/2020 10:40 AM    LDLCALC 123 08/18/2022 07:33 AM    LDLCALC 119 08/27/2021 07:10 AM    LDLCALC 105 08/04/2020 10:40 AM    GLUCOSE 90 08/18/2022 07:33 AM    LABA1C 5.1 08/27/2021 07:10 AM    LABA1C 5.0 08/04/2020 10:40 AM    LABA1C 4.9 08/24/2013 07:29 AM The 10-year ASCVD risk score (Cisco Lind et al., 2013) is: 3.8%    Values used to calculate the score:      Age: 49 years      Sex: Male      Is Non- : Yes      Diabetic: No      Tobacco smoker: No      Systolic Blood Pressure: 903 mmHg      Is BP treated: No      HDL Cholesterol: 65 mg/dL      Total Cholesterol: 199 mg/dL    Immunization History   Administered Date(s) Administered    COVID-19, MODERNA BLUE border, Primary or Immunocompromised, (age 12y+), IM, 100 mcg/0.5mL 02/26/2021, 03/26/2021, 11/12/2021    COVID-19, PFIZER GRAY top, DO NOT Dilute, (age 15 y+), IM, 30 mcg/0.3 mL 06/10/2022    Influenza Virus Vaccine 09/30/2012    Influenza, AFLURIA (age 1 yrs+), FLUZONE, (age 10 mo+), MDV, 0.5mL 09/30/2012    Influenza, FLUARIX, FLULAVAL, FLUZONE (age 10 mo+) AND AFLURIA, (age 1 y+), PF, 0.5mL 10/19/2013, 11/04/2017, 10/31/2018, 10/19/2020, 11/12/2021    Influenza, FLUCELVAX, (age 10 mo+), MDCK, PF, 0.5mL 10/26/2019    Influenza, High Dose (Fluzone 65 yrs and older) 10/10/2015    Tdap (Boostrix, Adacel) 08/18/2017       Health Maintenance   Topic Date Due    Hepatitis C screen  Never done    Shingles vaccine (1 of 2) Never done    Flu vaccine (1) 09/01/2022    Depression Screen  07/12/2023    Colorectal Cancer Screen  12/22/2026    Lipids  08/18/2027    DTaP/Tdap/Td vaccine (2 - Td or Tdap) 08/18/2027    COVID-19 Vaccine  Completed    HIV screen  Completed    Hepatitis A vaccine  Aged Out    Hepatitis B vaccine  Aged Out    Hib vaccine  Aged Out    Meningococcal (ACWY) vaccine  Aged Out    Pneumococcal 0-64 years Vaccine  Aged Out       Assessment & Plan   Annual physical exam  Recommend continued healthy lifestyle practices. Left temporal headache  Resolved: Awaiting results of CT scan  CKD (chronic kidney disease) stage 2, GFR 60-89 ml/min  -Follow-up with nephrology  Advised patient to increase hydration and to avoid any NSAIDs  Return in about 6 months (around 3/6/2023). --Cherri Bill MD

## 2022-09-26 ENCOUNTER — HOSPITAL ENCOUNTER (OUTPATIENT)
Dept: LAB | Age: 52
Discharge: HOME | End: 2022-09-26
Payer: COMMERCIAL

## 2022-09-26 LAB
ALBUMIN SERPL-MCNC: 4.6 G/DL (ref 3.5–4.6)
ANION GAP SERPL CALCULATED.3IONS-SCNC: 12 MEQ/L (ref 9–15)
BILIRUBIN URINE: NEGATIVE
BLOOD, URINE: NEGATIVE
BUN BLDV-MCNC: 21 MG/DL (ref 6–20)
CALCIUM SERPL-MCNC: 9.2 MG/DL (ref 8.5–9.9)
CHLORIDE BLD-SCNC: 102 MEQ/L (ref 95–107)
CLARITY: CLEAR
CO2: 26 MEQ/L (ref 20–31)
COLOR: YELLOW
CREAT SERPL-MCNC: 1.27 MG/DL (ref 0.7–1.2)
GFR AFRICAN AMERICAN: >60
GFR NON-AFRICAN AMERICAN: 59.8
GLUCOSE BLD-MCNC: 87 MG/DL (ref 70–99)
GLUCOSE URINE: NEGATIVE MG/DL
HCT VFR BLD CALC: 39.2 % (ref 42–52)
HEMOGLOBIN: 13.2 G/DL (ref 14–18)
KETONES, URINE: NEGATIVE MG/DL
LEUKOCYTE ESTERASE, URINE: NEGATIVE
MCH RBC QN AUTO: 33.5 PG (ref 27–31.3)
MCHC RBC AUTO-ENTMCNC: 33.8 % (ref 33–37)
MCV RBC AUTO: 99.2 FL (ref 80–100)
NITRITE, URINE: NEGATIVE
PDW BLD-RTO: 12.9 % (ref 11.5–14.5)
PH UA: 6.5 (ref 5–9)
PHOSPHORUS: 2.2 MG/DL (ref 2.3–4.8)
PLATELET # BLD: 264 K/UL (ref 130–400)
POTASSIUM SERPL-SCNC: 4.3 MEQ/L (ref 3.4–4.9)
PROTEIN UA: NEGATIVE MG/DL
RBC # BLD: 3.95 M/UL (ref 4.7–6.1)
SODIUM BLD-SCNC: 140 MEQ/L (ref 135–144)
SPECIFIC GRAVITY UA: 1.01 (ref 1–1.03)
UROBILINOGEN, URINE: 0.2 E.U./DL
WBC # BLD: 4.1 K/UL (ref 4.8–10.8)

## 2022-09-26 PROCEDURE — 80069 RENAL FUNCTION PANEL: CPT

## 2022-09-26 PROCEDURE — 85027 COMPLETE CBC AUTOMATED: CPT

## 2022-09-26 PROCEDURE — 81003 URINALYSIS AUTO W/O SCOPE: CPT

## 2022-09-26 PROCEDURE — 84156 ASSAY OF PROTEIN URINE: CPT

## 2022-09-26 PROCEDURE — 36415 COLL VENOUS BLD VENIPUNCTURE: CPT

## 2022-09-28 LAB
CREATININE URINE: 103.4 MG/DL
PROTEIN PROTEIN: 8 MG/DL
PROTEIN/CREAT RATIO: 0.1 ML/ML
PROTEIN/CREAT RATIO: 0.1 ML/ML (ref 0–0.2)

## 2022-10-22 ENCOUNTER — HOSPITAL ENCOUNTER (OUTPATIENT)
Dept: ULTRASOUND IMAGING | Age: 52
Discharge: HOME | End: 2022-10-24
Payer: COMMERCIAL

## 2022-10-22 DIAGNOSIS — N18.31 CHRONIC KIDNEY DISEASE, STAGE 3A (HCC): ICD-10-CM

## 2022-10-22 PROCEDURE — 76770 US EXAM ABDO BACK WALL COMP: CPT

## 2022-11-10 ENCOUNTER — OFFICE VISIT (OUTPATIENT)
Dept: FAMILY MEDICINE CLINIC | Age: 52
End: 2022-11-10
Payer: COMMERCIAL

## 2022-11-10 VITALS
WEIGHT: 210 LBS | TEMPERATURE: 97.7 F | OXYGEN SATURATION: 97 % | DIASTOLIC BLOOD PRESSURE: 80 MMHG | SYSTOLIC BLOOD PRESSURE: 114 MMHG | HEART RATE: 63 BPM | HEIGHT: 73 IN | BODY MASS INDEX: 27.83 KG/M2

## 2022-11-10 DIAGNOSIS — H61.23 BILATERAL IMPACTED CERUMEN: ICD-10-CM

## 2022-11-10 DIAGNOSIS — H66.002 NON-RECURRENT ACUTE SUPPURATIVE OTITIS MEDIA OF LEFT EAR WITHOUT SPONTANEOUS RUPTURE OF TYMPANIC MEMBRANE: Primary | ICD-10-CM

## 2022-11-10 PROCEDURE — 99213 OFFICE O/P EST LOW 20 MIN: CPT

## 2022-11-10 PROCEDURE — G8419 CALC BMI OUT NRM PARAM NOF/U: HCPCS

## 2022-11-10 PROCEDURE — 69209 REMOVE IMPACTED EAR WAX UNI: CPT

## 2022-11-10 PROCEDURE — G8482 FLU IMMUNIZE ORDER/ADMIN: HCPCS

## 2022-11-10 PROCEDURE — 3017F COLORECTAL CA SCREEN DOC REV: CPT

## 2022-11-10 PROCEDURE — 1036F TOBACCO NON-USER: CPT

## 2022-11-10 PROCEDURE — G8427 DOCREV CUR MEDS BY ELIG CLIN: HCPCS

## 2022-11-10 RX ORDER — CEFDINIR 300 MG/1
300 CAPSULE ORAL 2 TIMES DAILY
Qty: 14 CAPSULE | Refills: 0 | Status: SHIPPED | OUTPATIENT
Start: 2022-11-10 | End: 2022-11-17

## 2022-11-10 ASSESSMENT — ENCOUNTER SYMPTOMS
EYES NEGATIVE: 1
SORE THROAT: 0
RESPIRATORY NEGATIVE: 1
COLOR CHANGE: 0
SINUS PRESSURE: 0

## 2022-11-10 NOTE — PROGRESS NOTES
[facility] Encounter        ASSESSMENT/PLAN     Duane Morel is a 47 y.o. male who presents with:  Reporting plugged feeling in bilateral ears for several days history of frequent cerumen impactions and need to have flushed. Patient currently denies any pain in either ear. Neck is supple there are no masses. No mastoid or tragal tenderness. 1. Non-recurrent acute suppurative otitis media of left ear without spontaneous rupture of tympanic membrane    2. Bilateral impacted cerumen      Bilateral ears are irrigated with saline and peroxide that is warmed. Right ear large amount of cerumen is removed without issue and patient tolerated well. During flushing of the left ear patient began to experience nausea and dizziness. He had 1 episode of vomiting. Flushing was discontinued. After allowing patient time to recover, additional amount of cerumen was able to be extracted using the ear curette and he tolerated this without pain or nausea. Examination of the TM after cerumen removal reveals that is injected and bulging. Order for cefdinir to treat left otitis media. PATIENT REFERRED TO:  Return if symptoms worsen or fail to improve. DISCHARGE MEDICATIONS:  New Prescriptions    CEFDINIR (OMNICEF) 300 MG CAPSULE    Take 1 capsule by mouth 2 times daily for 7 days     Cannot display discharge medications since this is not an admission. Kendall Ontiveros, APRN - CNP    CHIEF COMPLAINT       Chief Complaint   Patient presents with    Otalgia     Both ears feel a discomfort. SUBJECTIVE/REVIEW OF SYSTEMS     Review of Systems   Constitutional:  Negative for chills, fatigue and fever. HENT:  Positive for hearing loss. Negative for congestion, ear discharge, ear pain, sinus pressure and sore throat. Eyes: Negative. Respiratory: Negative. Cardiovascular: Negative. Skin:  Negative for color change.    Neurological:  Negative for dizziness, weakness, light-headedness and headaches. Hematological:  Negative for adenopathy. Psychiatric/Behavioral: Negative. Negative for sleep disturbance. OBJECTIVE/PHYSICAL EXAM     Physical Exam  Constitutional:       General: He is not in acute distress. Appearance: Normal appearance. He is not ill-appearing. HENT:      Head: Normocephalic. Right Ear: Tympanic membrane, ear canal and external ear normal. No decreased hearing noted. No drainage or swelling. There is impacted cerumen. No foreign body. No mastoid tenderness. Tympanic membrane is not erythematous, retracted or bulging. Left Ear: Ear canal and external ear normal. No decreased hearing noted. No drainage or swelling. There is impacted cerumen. No foreign body. No mastoid tenderness. Tympanic membrane is erythematous and bulging. Tympanic membrane is not retracted. Nose: Nose normal. No congestion or rhinorrhea. Mouth/Throat:      Mouth: Mucous membranes are moist.      Pharynx: Oropharynx is clear. Eyes:      General:         Right eye: No discharge. Left eye: No discharge. Conjunctiva/sclera: Conjunctivae normal.   Cardiovascular:      Rate and Rhythm: Normal rate and regular rhythm. Pulses: Normal pulses. Pulmonary:      Effort: Pulmonary effort is normal. No respiratory distress. Musculoskeletal:      Cervical back: Normal range of motion and neck supple. No rigidity or tenderness. Lymphadenopathy:      Cervical: No cervical adenopathy. Skin:     General: Skin is warm. Capillary Refill: Capillary refill takes less than 2 seconds. Neurological:      Mental Status: He is alert and oriented to person, place, and time. Mental status is at baseline.    Psychiatric:         Mood and Affect: Mood normal.       VITALS  BP: 114/80, Temp: 97.7 °F (36.5 °C), Temp Source: Temporal, Heart Rate: 63,  , SpO2: 97 %      PAST MEDICAL HISTORY         Diagnosis Date    Allergic rhinitis     Mild anemia Mild hyperlipidemia      SURGICAL HISTORY     Patient  has a past surgical history that includes [place] tooth extraction and Colonoscopy (N/A, 12/22/2021). CURRENT MEDICATIONS       Previous Medications    CHOLECALCIFEROL (VITAMIN D3) 125 MCG (5000 UT) TABS    Take by mouth    HYDROCORTISONE 1 % CREAM    Apply topically 2 times daily. LORATADINE (CLARITIN) 10 MG CAPSULE    Take 10 mg by mouth daily     MULTIPLE VITAMINS-MINERALS (THERAPEUTIC MULTIVITAMIN-MINERALS) TABLET    Take 1 tablet by mouth daily    VITAMIN C (ASCORBIC ACID) 500 MG TABLET    Take 500 mg by mouth daily     ALLERGIES     Patient is is allergic to pcn [penicillins]. FAMILY HISTORY     Patient'sfamily history includes Emphysema in his father. HISTORY     Patient  reports that he has never smoked. He has never been exposed to tobacco smoke. He has never used smokeless tobacco. He reports that he does not drink alcohol and does not use drugs. READY CARE COURSE   No orders of the defined types were placed in this encounter. Labs:  No results found for this visit on 11/10/22. IMAGING:  No orders to display     Scheduled Meds:  Continuous Infusions:  PRN Meds:.

## 2023-01-31 ENCOUNTER — HOSPITAL ENCOUNTER (OUTPATIENT)
Dept: LAB | Age: 53
Discharge: HOME | End: 2023-01-31
Payer: COMMERCIAL

## 2023-01-31 LAB
ALBUMIN SERPL-MCNC: 4.2 G/DL (ref 3.5–4.6)
ANION GAP SERPL CALCULATED.3IONS-SCNC: 6 MEQ/L (ref 9–15)
BILIRUBIN URINE: NEGATIVE
BLOOD, URINE: NEGATIVE
BUN BLDV-MCNC: 15 MG/DL (ref 6–20)
CALCIUM SERPL-MCNC: 9 MG/DL (ref 8.5–9.9)
CHLORIDE BLD-SCNC: 106 MEQ/L (ref 95–107)
CLARITY: CLEAR
CO2: 30 MEQ/L (ref 20–31)
COLOR: YELLOW
CREAT SERPL-MCNC: 1.25 MG/DL (ref 0.7–1.2)
GFR SERPL CREATININE-BSD FRML MDRD: >60 ML/MIN/{1.73_M2}
GLUCOSE BLD-MCNC: 97 MG/DL (ref 70–99)
GLUCOSE URINE: NEGATIVE MG/DL
KETONES, URINE: NEGATIVE MG/DL
LEUKOCYTE ESTERASE, URINE: NEGATIVE
NITRITE, URINE: NEGATIVE
PH UA: 7.5 (ref 5–9)
PHOSPHORUS: 2.7 MG/DL (ref 2.3–4.8)
POTASSIUM SERPL-SCNC: 4.8 MEQ/L (ref 3.4–4.9)
PROTEIN UA: NEGATIVE MG/DL
SODIUM BLD-SCNC: 142 MEQ/L (ref 135–144)
SPECIFIC GRAVITY UA: 1.02 (ref 1–1.03)
UROBILINOGEN, URINE: 0.2 E.U./DL
VITAMIN D 25-HYDROXY: 34 NG/ML

## 2023-01-31 PROCEDURE — 82306 VITAMIN D 25 HYDROXY: CPT

## 2023-01-31 PROCEDURE — 80069 RENAL FUNCTION PANEL: CPT

## 2023-01-31 PROCEDURE — 81003 URINALYSIS AUTO W/O SCOPE: CPT

## 2023-01-31 PROCEDURE — 36415 COLL VENOUS BLD VENIPUNCTURE: CPT

## 2023-01-31 PROCEDURE — 83970 ASSAY OF PARATHORMONE: CPT

## 2023-02-02 LAB
MISCELLANEOUS LAB TEST RESULT: NORMAL
MISCELLANEOUS PROMPT 2: NORMAL

## 2023-02-10 ENCOUNTER — OFFICE VISIT (OUTPATIENT)
Dept: FAMILY MEDICINE CLINIC | Age: 53
End: 2023-02-10
Payer: COMMERCIAL

## 2023-02-10 DIAGNOSIS — H91.93 CHANGE IN HEARING, BILATERAL: ICD-10-CM

## 2023-02-10 DIAGNOSIS — H61.23 BILATERAL IMPACTED CERUMEN: Primary | ICD-10-CM

## 2023-02-10 PROCEDURE — 69209 REMOVE IMPACTED EAR WAX UNI: CPT | Performed by: NURSE PRACTITIONER

## 2023-02-10 PROCEDURE — G8419 CALC BMI OUT NRM PARAM NOF/U: HCPCS | Performed by: NURSE PRACTITIONER

## 2023-02-10 PROCEDURE — 99213 OFFICE O/P EST LOW 20 MIN: CPT | Performed by: NURSE PRACTITIONER

## 2023-02-10 PROCEDURE — G8482 FLU IMMUNIZE ORDER/ADMIN: HCPCS | Performed by: NURSE PRACTITIONER

## 2023-02-10 PROCEDURE — 3017F COLORECTAL CA SCREEN DOC REV: CPT | Performed by: NURSE PRACTITIONER

## 2023-02-10 PROCEDURE — 1036F TOBACCO NON-USER: CPT | Performed by: NURSE PRACTITIONER

## 2023-02-10 PROCEDURE — G8427 DOCREV CUR MEDS BY ELIG CLIN: HCPCS | Performed by: NURSE PRACTITIONER

## 2023-02-10 ASSESSMENT — ENCOUNTER SYMPTOMS
NAUSEA: 0
COUGH: 0

## 2023-02-10 NOTE — PROGRESS NOTES
Subjective  Ammon Levin Sr., 47 y.o. male presents today with:  Chief Complaint   Patient presents with    Cerumen Impaction         HPI  Presents to [facility] for cerumen impaction   Both ears affected   C/o muffled hearing   Denies otalgia   Denies drainage from ears   Has tried OTC wax removal/irrigation kit at home   Prior ear irrigation at office   Denies dizziness or feeling lightheaded   Denies headache   Eating and drinking   Sleep unchanged                 Past Medical History:   Diagnosis Date    Allergic rhinitis     Mild anemia     Mild hyperlipidemia       Past Surgical History:   Procedure Laterality Date    COLONOSCOPY N/A 12/22/2021    COLORECTAL CANCER SCREENING, NOT HIGH RISK WITH POLYPECTOMY performed by Cedric Heath MD at [facility] EXTRACTION       Family History   Problem Relation Age of Onset    Emphysema Father     Colon Cancer Neg Hx              Review of Systems   Constitutional:  Negative for activity change, appetite change, chills, diaphoresis, fatigue and fever. HENT:  Negative for ear discharge. Ear pain: congested/full feeling. Respiratory:  Negative for cough. Gastrointestinal:  Negative for nausea. Neurological:  Negative for dizziness, light-headedness and headaches. PMH, Surgical Hx, Family Hx, and Social Hx reviewed and updated. Objective  There were no vitals filed for this visit. BP Readings from Last 3 Encounters:   11/10/22 114/80   09/06/22 110/76   07/12/22 110/84     Wt Readings from Last 3 Encounters:   11/10/22 210 lb (95.3 kg)   09/06/22 213 lb 9.6 oz (96.9 kg)   07/12/22 210 lb (95.3 kg)         Physical Exam  Vitals reviewed. Constitutional:       Appearance: Normal appearance. HENT:      Right Ear: External ear normal. There is impacted cerumen. Left Ear: External ear normal. There is impacted cerumen. Mouth/Throat:      Lips: Pink.    Eyes:      General: Lids are normal.      Conjunctiva/sclera: Conjunctivae normal.   Cardiovascular:      Rate and Rhythm: Normal rate. Pulmonary:      Effort: Pulmonary effort is normal.   Musculoskeletal:         General: Normal range of motion. Cervical back: Normal range of motion. Skin:     General: Skin is warm and dry. Neurological:      General: No focal deficit present. Mental Status: He is alert and oriented to person, place, and time. Assessment & Plan    Diagnosis Orders   1. Bilateral impacted cerumen  [place] REMOVAL IMPACTED CERUMEN IRRIGATION/LVG UNILAT      2. Change in hearing, bilateral          Orders Placed This Encounter   Procedures    [place] REMOVAL IMPACTED CERUMEN IRRIGATION/LVG UNILAT       No orders of the defined types were placed in this encounter. Return prn for cerumen impaction         PROCEDURE:  The patient had their both ear(s) irrigated to remove impacted cerumen. 10 drops of debrox ceruminolytic solution was instilled in each ear canal.  After 10 minutes, a 50:50 mixture of warm water and hydrogen peroxide was used to flush each ear canal.  A curette was used to remove softened wax. The ear canals and TMs appear normal. The patient tolerated the procedure well. Reviewed with the patient: current clinical status & the procedure for removal of cerumen in the office. The treatment plan/rationale and result expectations have been discussed with the patient who expressed understanding. Close follow up to evaluate treatment results and for coordination of care. I have reviewed the patient's medical history in detail and updated the computerized patient record.       MIGEL Wang NP

## 2023-05-08 ENCOUNTER — OFFICE VISIT (OUTPATIENT)
Dept: FAMILY MEDICINE CLINIC | Age: 53
End: 2023-05-08
Payer: COMMERCIAL

## 2023-05-08 VITALS
DIASTOLIC BLOOD PRESSURE: 86 MMHG | HEART RATE: 92 BPM | BODY MASS INDEX: 29.34 KG/M2 | TEMPERATURE: 98.2 F | SYSTOLIC BLOOD PRESSURE: 132 MMHG | OXYGEN SATURATION: 96 % | WEIGHT: 221.4 LBS | HEIGHT: 73 IN

## 2023-05-08 DIAGNOSIS — Z88.9 HISTORY OF SEASONAL ALLERGIES: ICD-10-CM

## 2023-05-08 DIAGNOSIS — H92.02 LEFT EAR PAIN: Primary | ICD-10-CM

## 2023-05-08 PROCEDURE — G8419 CALC BMI OUT NRM PARAM NOF/U: HCPCS | Performed by: NURSE PRACTITIONER

## 2023-05-08 PROCEDURE — G8427 DOCREV CUR MEDS BY ELIG CLIN: HCPCS | Performed by: NURSE PRACTITIONER

## 2023-05-08 PROCEDURE — 3017F COLORECTAL CA SCREEN DOC REV: CPT | Performed by: NURSE PRACTITIONER

## 2023-05-08 PROCEDURE — 99213 OFFICE O/P EST LOW 20 MIN: CPT | Performed by: NURSE PRACTITIONER

## 2023-05-08 PROCEDURE — 1036F TOBACCO NON-USER: CPT | Performed by: NURSE PRACTITIONER

## 2023-05-08 ASSESSMENT — ENCOUNTER SYMPTOMS
SHORTNESS OF BREATH: 0
WHEEZING: 0
COUGH: 0
SORE THROAT: 0
SINUS PRESSURE: 1
RHINORRHEA: 0
SINUS PAIN: 0

## 2023-05-08 NOTE — PROGRESS NOTES
Subjective:      Patient ID: Fina Saleh. is a 47 y.o. male who presents today for:  Chief Complaint   Patient presents with    Otalgia     Left ear pain, sinus pressure possibly due to allergies       Otalgia   There is pain in the left ear. This is a new problem. The current episode started in the past 7 days. The problem occurs constantly. The problem has been unchanged. There has been no fever. The pain is mild. Pertinent negatives include no coughing, ear discharge, headaches, hearing loss, neck pain, rhinorrhea or sore throat. Treatments tried: Claritin. The treatment provided mild relief. Past Medical History:   Diagnosis Date    Allergic rhinitis     Mild anemia     Mild hyperlipidemia      Past Surgical History:   Procedure Laterality Date    COLONOSCOPY N/A 12/22/2021    COLORECTAL CANCER SCREENING, NOT HIGH RISK WITH POLYPECTOMY performed by Karina Shelton MD at [place] [address] EXTRACTION       Family History   Problem Relation Age of Onset    Emphysema Father     Colon Cancer Neg Hx      Allergies   Allergen Reactions    Pcn [Penicillins] Nausea And Vomiting         Review of Systems   Constitutional:  Negative for chills, fatigue and fever. HENT:  Positive for ear pain and sinus pressure. Negative for congestion, ear discharge, hearing loss, postnasal drip, rhinorrhea, sinus pain and sore throat. Respiratory:  Negative for cough, shortness of breath and wheezing. Cardiovascular:  Negative for chest pain. Musculoskeletal:  Negative for neck pain. Neurological:  Negative for headaches. Objective:   /86 (Site: Right Upper Arm, Position: Sitting, Cuff Size: Large Adult)   Pulse 92   Temp 98.2 °F (36.8 °C) (Temporal)   Ht 6' 1\" (1.854 m)   Wt 221 lb 6.4 oz (100.4 kg)   SpO2 96%   BMI 29.21 kg/m²     Physical Exam  Vitals reviewed. Constitutional:       General: He is not in acute distress. Appearance: He is well-developed.    HENT:      Head:

## 2023-08-28 ENCOUNTER — OFFICE VISIT (OUTPATIENT)
Dept: FAMILY MEDICINE CLINIC | Age: 53
End: 2023-08-28
Payer: COMMERCIAL

## 2023-08-28 VITALS
HEIGHT: 73 IN | TEMPERATURE: 97.1 F | DIASTOLIC BLOOD PRESSURE: 86 MMHG | SYSTOLIC BLOOD PRESSURE: 124 MMHG | BODY MASS INDEX: 29.95 KG/M2 | HEART RATE: 77 BPM | OXYGEN SATURATION: 98 % | WEIGHT: 226 LBS

## 2023-08-28 DIAGNOSIS — H61.23 BILATERAL IMPACTED CERUMEN: Primary | ICD-10-CM

## 2023-08-28 DIAGNOSIS — H66.002 NON-RECURRENT ACUTE SUPPURATIVE OTITIS MEDIA OF LEFT EAR WITHOUT SPONTANEOUS RUPTURE OF TYMPANIC MEMBRANE: ICD-10-CM

## 2023-08-28 PROCEDURE — 3017F COLORECTAL CA SCREEN DOC REV: CPT

## 2023-08-28 PROCEDURE — G8419 CALC BMI OUT NRM PARAM NOF/U: HCPCS

## 2023-08-28 PROCEDURE — 99213 OFFICE O/P EST LOW 20 MIN: CPT

## 2023-08-28 PROCEDURE — G8427 DOCREV CUR MEDS BY ELIG CLIN: HCPCS

## 2023-08-28 PROCEDURE — 69209 REMOVE IMPACTED EAR WAX UNI: CPT

## 2023-08-28 PROCEDURE — 1036F TOBACCO NON-USER: CPT

## 2023-08-28 RX ORDER — CEFDINIR 300 MG/1
300 CAPSULE ORAL 2 TIMES DAILY
Qty: 14 CAPSULE | Refills: 0 | Status: SHIPPED | OUTPATIENT
Start: 2023-08-28 | End: 2023-09-04

## 2023-08-28 ASSESSMENT — ENCOUNTER SYMPTOMS
EYES NEGATIVE: 1
SINUS PRESSURE: 0
RHINORRHEA: 0
RESPIRATORY NEGATIVE: 1
SORE THROAT: 0

## 2023-08-28 NOTE — PROGRESS NOTES
Medications    CHOLECALCIFEROL (VITAMIN D3) 125 MCG (5000 UT) TABS    Take by mouth    HYDROCORTISONE 1 % CREAM    Apply topically 2 times daily. LORATADINE (CLARITIN) 10 MG CAPSULE    Take 1 capsule by mouth daily    MULTIPLE VITAMINS-MINERALS (THERAPEUTIC MULTIVITAMIN-MINERALS) TABLET    Take 1 tablet by mouth daily    VITAMIN C (ASCORBIC ACID) 500 MG TABLET    Take 1 tablet by mouth daily     ALLERGIES     Patient is is allergic to pcn [penicillins]. FAMILY HISTORY     Patient'sfamily history includes Emphysema in his father. HISTORY     Patient  reports that he has never smoked. He has never been exposed to tobacco smoke. He has never used smokeless tobacco. He reports that he does not drink alcohol and does not use drugs. READY CARE COURSE   No orders of the defined types were placed in this encounter. Labs:  No results found for this visit on 08/28/23. IMAGING:  No orders to display     Scheduled Meds:  Continuous Infusions:  PRN Meds:.

## 2023-09-05 ENCOUNTER — TELEPHONE (OUTPATIENT)
Dept: FAMILY MEDICINE CLINIC | Age: 53
End: 2023-09-05

## 2023-09-05 DIAGNOSIS — Z11.59 ENCOUNTER FOR HEPATITIS C SCREENING TEST FOR LOW RISK PATIENT: ICD-10-CM

## 2023-09-05 DIAGNOSIS — Z12.5 PROSTATE CANCER SCREENING: ICD-10-CM

## 2023-09-05 DIAGNOSIS — Z00.00 ANNUAL PHYSICAL EXAM: Primary | ICD-10-CM

## 2023-09-09 ENCOUNTER — HOSPITAL ENCOUNTER (OUTPATIENT)
Dept: LAB | Age: 53
Discharge: HOME | End: 2023-09-09
Payer: COMMERCIAL

## 2023-09-09 DIAGNOSIS — Z00.00 ANNUAL PHYSICAL EXAM: ICD-10-CM

## 2023-09-09 DIAGNOSIS — Z12.5 PROSTATE CANCER SCREENING: ICD-10-CM

## 2023-09-09 DIAGNOSIS — Z11.59 ENCOUNTER FOR HEPATITIS C SCREENING TEST FOR LOW RISK PATIENT: ICD-10-CM

## 2023-09-09 LAB
ALBUMIN SERPL-MCNC: 4.4 G/DL (ref 3.5–4.6)
ALP SERPL-CCNC: 59 U/L (ref 35–104)
ALT SERPL-CCNC: 25 U/L (ref 0–41)
ANION GAP SERPL CALCULATED.3IONS-SCNC: 7 MEQ/L (ref 9–15)
AST SERPL-CCNC: 24 U/L (ref 0–40)
BASOPHILS # BLD: 0 K/UL (ref 0–0.2)
BASOPHILS NFR BLD: 0.3 %
BILIRUB SERPL-MCNC: 0.7 MG/DL (ref 0.2–0.7)
BUN SERPL-MCNC: 16 MG/DL (ref 6–20)
CALCIUM SERPL-MCNC: 9.3 MG/DL (ref 8.5–9.9)
CHLORIDE SERPL-SCNC: 103 MEQ/L (ref 95–107)
CHOLEST SERPL-MCNC: 194 MG/DL (ref 0–199)
CO2 SERPL-SCNC: 29 MEQ/L (ref 20–31)
CREAT SERPL-MCNC: 1.32 MG/DL (ref 0.7–1.2)
EOSINOPHIL # BLD: 0.1 K/UL (ref 0–0.7)
EOSINOPHIL NFR BLD: 1.8 %
ERYTHROCYTE [DISTWIDTH] IN BLOOD BY AUTOMATED COUNT: 12.8 % (ref 11.5–14.5)
GLOBULIN SER CALC-MCNC: 3.2 G/DL (ref 2.3–3.5)
GLUCOSE SERPL-MCNC: 93 MG/DL (ref 70–99)
HBA1C MFR BLD: 5 % (ref 4.8–5.9)
HCT VFR BLD AUTO: 40.4 % (ref 42–52)
HDLC SERPL-MCNC: 64 MG/DL (ref 40–59)
HGB BLD-MCNC: 13.6 G/DL (ref 14–18)
LDL CHOLESTEROL CALCULATED: 119 MG/DL (ref 0–129)
LYMPHOCYTES # BLD: 1.8 K/UL (ref 1–4.8)
LYMPHOCYTES NFR BLD: 36.7 %
MCH RBC QN AUTO: 33.2 PG (ref 27–31.3)
MCHC RBC AUTO-ENTMCNC: 33.6 % (ref 33–37)
MCV RBC AUTO: 98.9 FL (ref 79–92.2)
MONOCYTES # BLD: 0.5 K/UL (ref 0.2–0.8)
MONOCYTES NFR BLD: 10.1 %
NEUTROPHILS # BLD: 2.5 K/UL (ref 1.4–6.5)
NEUTS SEG NFR BLD: 51.1 %
PLATELET # BLD AUTO: 290 K/UL (ref 130–400)
POTASSIUM SERPL-SCNC: 4.4 MEQ/L (ref 3.4–4.9)
PROT SERPL-MCNC: 7.6 G/DL (ref 6.3–8)
PSA SERPL-MCNC: 1.12 NG/ML (ref 0–4)
RBC # BLD AUTO: 4.09 M/UL (ref 4.7–6.1)
SODIUM SERPL-SCNC: 139 MEQ/L (ref 135–144)
TRIGLYCERIDE, FASTING: 57 MG/DL (ref 0–150)
WBC # BLD AUTO: 5 K/UL (ref 4.8–10.8)

## 2023-09-09 PROCEDURE — 80061 LIPID PANEL: CPT

## 2023-09-09 PROCEDURE — 80053 COMPREHEN METABOLIC PANEL: CPT

## 2023-09-09 PROCEDURE — 86803 HEPATITIS C AB TEST: CPT

## 2023-09-09 PROCEDURE — 36415 COLL VENOUS BLD VENIPUNCTURE: CPT

## 2023-09-09 PROCEDURE — 85025 COMPLETE CBC W/AUTO DIFF WBC: CPT

## 2023-09-09 PROCEDURE — 83036 HEMOGLOBIN GLYCOSYLATED A1C: CPT

## 2023-09-09 PROCEDURE — 84153 ASSAY OF PSA TOTAL: CPT

## 2023-09-10 LAB — HEPATITIS C ANTIBODY: NONREACTIVE

## 2023-09-11 ENCOUNTER — OFFICE VISIT (OUTPATIENT)
Dept: FAMILY MEDICINE CLINIC | Age: 53
End: 2023-09-11
Payer: COMMERCIAL

## 2023-09-11 VITALS
DIASTOLIC BLOOD PRESSURE: 82 MMHG | WEIGHT: 226.6 LBS | TEMPERATURE: 97.6 F | HEART RATE: 63 BPM | SYSTOLIC BLOOD PRESSURE: 130 MMHG | OXYGEN SATURATION: 100 % | BODY MASS INDEX: 29.9 KG/M2

## 2023-09-11 DIAGNOSIS — Z00.00 ANNUAL PHYSICAL EXAM: Primary | ICD-10-CM

## 2023-09-11 PROCEDURE — 99396 PREV VISIT EST AGE 40-64: CPT | Performed by: FAMILY MEDICINE

## 2023-09-11 NOTE — PROGRESS NOTES
Thyroid: No thyromegaly. Vascular: No carotid bruit or JVD. Cardiovascular:      Rate and Rhythm: Normal rate and regular rhythm. Pulses: Normal pulses. Heart sounds: Normal heart sounds. No murmur heard. No friction rub. No gallop. Pulmonary:      Effort: Pulmonary effort is normal. No respiratory distress. Breath sounds: Normal breath sounds. No stridor. No wheezing or rales. Chest:      Chest wall: No tenderness. Abdominal:      General: Abdomen is flat. Bowel sounds are normal. There is no distension. Palpations: Abdomen is soft. There is no mass. Tenderness: There is no abdominal tenderness. There is no right CVA tenderness, left CVA tenderness, guarding or rebound. Musculoskeletal:         General: No swelling, tenderness or deformity. Normal range of motion. Cervical back: Normal range of motion and neck supple. Lymphadenopathy:      Cervical: No cervical adenopathy. Skin:     General: Skin is warm and dry. Coloration: Skin is not pale. Findings: No erythema or rash. Neurological:      General: No focal deficit present. Mental Status: He is alert and oriented to person, place, and time. Mental status is at baseline. Cranial Nerves: No cranial nerve deficit. Motor: No abnormal muscle tone. Coordination: Coordination normal.      Deep Tendon Reflexes: Reflexes are normal and symmetric. Reflexes normal.   Psychiatric:         Behavior: Behavior normal.         Thought Content:  Thought content normal.         Judgment: Judgment normal.             Latest Ref Rng & Units 9/9/2023     8:33 AM 1/31/2023     8:07 AM 9/26/2022     9:03 AM   LAB PRIMARY CARE   A1C 4.8 - 5.9 % 5.0      A1C POC 4.8 - 5.9 % 5.0      GLU random 70 - 99 mg/dL 93  97  87    CHOL fast 0 - 199 mg/dL 194      TRUG fast 0 - 150 mg/dL 57      HDL 40 - 59 mg/dL 64      LDL CALC 0 - 129 mg/dL 119       - 144 mEq/L 139  142  140    K 3.4 - 4.9 mEq/L 4.4  4.8

## 2023-09-16 ASSESSMENT — ENCOUNTER SYMPTOMS
BACK PAIN: 0
CONSTIPATION: 0
WHEEZING: 0
CHEST TIGHTNESS: 0
EYE REDNESS: 0
ANAL BLEEDING: 0
APNEA: 0
CHOKING: 0
TROUBLE SWALLOWING: 0
COUGH: 0
EYE ITCHING: 0
FACIAL SWELLING: 0
DIARRHEA: 0
RHINORRHEA: 0
SINUS PRESSURE: 0
SORE THROAT: 0
NAUSEA: 0
ABDOMINAL PAIN: 0
EYE PAIN: 0
BLOOD IN STOOL: 0
VOICE CHANGE: 0
ABDOMINAL DISTENTION: 0
SHORTNESS OF BREATH: 0
PHOTOPHOBIA: 0
EYE DISCHARGE: 0

## 2023-12-11 ENCOUNTER — OFFICE VISIT (OUTPATIENT)
Dept: FAMILY MEDICINE CLINIC | Age: 53
End: 2023-12-11
Payer: COMMERCIAL

## 2023-12-11 VITALS
DIASTOLIC BLOOD PRESSURE: 68 MMHG | OXYGEN SATURATION: 99 % | SYSTOLIC BLOOD PRESSURE: 124 MMHG | HEART RATE: 63 BPM | WEIGHT: 226.4 LBS | BODY MASS INDEX: 29.87 KG/M2

## 2023-12-11 DIAGNOSIS — H61.23 IMPACTED CERUMEN, BILATERAL: Primary | ICD-10-CM

## 2023-12-11 PROCEDURE — G8419 CALC BMI OUT NRM PARAM NOF/U: HCPCS | Performed by: PHYSICIAN ASSISTANT

## 2023-12-11 PROCEDURE — 69210 REMOVE IMPACTED EAR WAX UNI: CPT | Performed by: PHYSICIAN ASSISTANT

## 2023-12-11 PROCEDURE — G8484 FLU IMMUNIZE NO ADMIN: HCPCS | Performed by: PHYSICIAN ASSISTANT

## 2023-12-11 PROCEDURE — 1036F TOBACCO NON-USER: CPT | Performed by: PHYSICIAN ASSISTANT

## 2023-12-11 PROCEDURE — 99213 OFFICE O/P EST LOW 20 MIN: CPT | Performed by: PHYSICIAN ASSISTANT

## 2023-12-11 PROCEDURE — 3017F COLORECTAL CA SCREEN DOC REV: CPT | Performed by: PHYSICIAN ASSISTANT

## 2023-12-11 PROCEDURE — G8427 DOCREV CUR MEDS BY ELIG CLIN: HCPCS | Performed by: PHYSICIAN ASSISTANT

## 2023-12-11 ASSESSMENT — ENCOUNTER SYMPTOMS
GASTROINTESTINAL NEGATIVE: 1
RESPIRATORY NEGATIVE: 1
EYES NEGATIVE: 1

## 2024-01-29 ENCOUNTER — OFFICE VISIT (OUTPATIENT)
Dept: FAMILY MEDICINE CLINIC | Age: 54
End: 2024-01-29
Payer: COMMERCIAL

## 2024-01-29 VITALS
SYSTOLIC BLOOD PRESSURE: 124 MMHG | BODY MASS INDEX: 29.69 KG/M2 | OXYGEN SATURATION: 99 % | HEART RATE: 72 BPM | WEIGHT: 225 LBS | DIASTOLIC BLOOD PRESSURE: 70 MMHG

## 2024-01-29 DIAGNOSIS — H61.23 IMPACTED CERUMEN, BILATERAL: Primary | ICD-10-CM

## 2024-01-29 PROCEDURE — 1036F TOBACCO NON-USER: CPT

## 2024-01-29 PROCEDURE — 3017F COLORECTAL CA SCREEN DOC REV: CPT

## 2024-01-29 PROCEDURE — 99213 OFFICE O/P EST LOW 20 MIN: CPT

## 2024-01-29 PROCEDURE — G8419 CALC BMI OUT NRM PARAM NOF/U: HCPCS

## 2024-01-29 PROCEDURE — G8427 DOCREV CUR MEDS BY ELIG CLIN: HCPCS

## 2024-01-29 PROCEDURE — G8484 FLU IMMUNIZE NO ADMIN: HCPCS

## 2024-01-29 ASSESSMENT — ENCOUNTER SYMPTOMS
COUGH: 0
SORE THROAT: 0
COLOR CHANGE: 0
RHINORRHEA: 0

## 2024-01-31 ENCOUNTER — OFFICE VISIT (OUTPATIENT)
Dept: FAMILY MEDICINE CLINIC | Age: 54
End: 2024-01-31
Payer: COMMERCIAL

## 2024-01-31 VITALS
HEART RATE: 70 BPM | BODY MASS INDEX: 29.55 KG/M2 | DIASTOLIC BLOOD PRESSURE: 86 MMHG | WEIGHT: 224 LBS | SYSTOLIC BLOOD PRESSURE: 124 MMHG | TEMPERATURE: 97.6 F

## 2024-01-31 DIAGNOSIS — H61.23 IMPACTED CERUMEN, BILATERAL: Primary | ICD-10-CM

## 2024-01-31 PROCEDURE — 3017F COLORECTAL CA SCREEN DOC REV: CPT | Performed by: FAMILY MEDICINE

## 2024-01-31 PROCEDURE — G8427 DOCREV CUR MEDS BY ELIG CLIN: HCPCS | Performed by: FAMILY MEDICINE

## 2024-01-31 PROCEDURE — G8484 FLU IMMUNIZE NO ADMIN: HCPCS | Performed by: FAMILY MEDICINE

## 2024-01-31 PROCEDURE — 1036F TOBACCO NON-USER: CPT | Performed by: FAMILY MEDICINE

## 2024-01-31 PROCEDURE — 99213 OFFICE O/P EST LOW 20 MIN: CPT | Performed by: FAMILY MEDICINE

## 2024-01-31 PROCEDURE — G8419 CALC BMI OUT NRM PARAM NOF/U: HCPCS | Performed by: FAMILY MEDICINE

## 2024-01-31 ASSESSMENT — PATIENT HEALTH QUESTIONNAIRE - PHQ9
1. LITTLE INTEREST OR PLEASURE IN DOING THINGS: 0
3. TROUBLE FALLING OR STAYING ASLEEP: 0
10. IF YOU CHECKED OFF ANY PROBLEMS, HOW DIFFICULT HAVE THESE PROBLEMS MADE IT FOR YOU TO DO YOUR WORK, TAKE CARE OF THINGS AT HOME, OR GET ALONG WITH OTHER PEOPLE: 0
SUM OF ALL RESPONSES TO PHQ9 QUESTIONS 1 & 2: 0
4. FEELING TIRED OR HAVING LITTLE ENERGY: 0
8. MOVING OR SPEAKING SO SLOWLY THAT OTHER PEOPLE COULD HAVE NOTICED. OR THE OPPOSITE, BEING SO FIGETY OR RESTLESS THAT YOU HAVE BEEN MOVING AROUND A LOT MORE THAN USUAL: 0
5. POOR APPETITE OR OVEREATING: 0
6. FEELING BAD ABOUT YOURSELF - OR THAT YOU ARE A FAILURE OR HAVE LET YOURSELF OR YOUR FAMILY DOWN: 0
SUM OF ALL RESPONSES TO PHQ QUESTIONS 1-9: 0
9. THOUGHTS THAT YOU WOULD BE BETTER OFF DEAD, OR OF HURTING YOURSELF: 0
7. TROUBLE CONCENTRATING ON THINGS, SUCH AS READING THE NEWSPAPER OR WATCHING TELEVISION: 0
2. FEELING DOWN, DEPRESSED OR HOPELESS: 0
SUM OF ALL RESPONSES TO PHQ QUESTIONS 1-9: 0

## 2024-01-31 NOTE — PROGRESS NOTES
Subjective:      Patient ID: Claude Prajapati Sr. is a 53 y.o. male who presents today for:     Chief Complaint   Patient presents with    Otalgia     L ear pain x1 week using Decongestant , w/o relief        Otalgia   There is pain in both ears. This is a recurrent problem. The problem has been unchanged. There has been no fever. The pain is moderate. Pertinent negatives include no abdominal pain, coughing, diarrhea, ear discharge, headaches, hearing loss, neck pain, rhinorrhea, sore throat or vomiting. He has tried nothing for the symptoms. Chronic cerumen impaction       Past Medical History:   Diagnosis Date    Allergic rhinitis     Mild anemia     Mild hyperlipidemia      Past Surgical History:   Procedure Laterality Date    COLONOSCOPY N/A 12/22/2021    COLORECTAL CANCER SCREENING, NOT HIGH RISK WITH POLYPECTOMY performed by Ricardo Haque MD at [facility]    WISDOM TOOTH EXTRACTION       Family History   Problem Relation Age of Onset    Emphysema Father     Colon Cancer Neg Hx      Social History     Socioeconomic History    Marital status:      Spouse name: Not on file    Number of children: Not on file    Years of education: Not on file    Highest education level: Not on file   Occupational History    Not on file   Tobacco Use    Smoking status: Never     Passive exposure: Never    Smokeless tobacco: Never   Vaping Use    Vaping Use: Never used   Substance and Sexual Activity    Alcohol use: No    Drug use: No    Sexual activity: Yes     Partners: Female   Other Topics Concern    Not on file   Social History Narrative    Not on file     Social Determinants of Health     Financial Resource Strain: Low Risk  (4/12/2023)    Overall Financial Resource Strain (CARDIA)     Difficulty of Paying Living Expenses: Not hard at all   Food Insecurity: Not on file (4/12/2023)   Transportation Needs: Unknown (4/12/2023)    PRAPARE - Transportation     Lack of Transportation (Medical): Not on file     Lack of

## 2024-02-05 RX ORDER — CEFDINIR 300 MG/1
300 CAPSULE ORAL 2 TIMES DAILY
Qty: 20 CAPSULE | Refills: 0 | Status: SHIPPED | OUTPATIENT
Start: 2024-02-05 | End: 2024-02-15

## 2024-02-06 ASSESSMENT — ENCOUNTER SYMPTOMS
CONSTIPATION: 0
RHINORRHEA: 0
APNEA: 0
CHEST TIGHTNESS: 0
ABDOMINAL PAIN: 0
SORE THROAT: 0
COUGH: 0
VOMITING: 0
BLOOD IN STOOL: 0
NAUSEA: 0
DIARRHEA: 0
SHORTNESS OF BREATH: 0

## 2024-03-26 ENCOUNTER — OFFICE VISIT (OUTPATIENT)
Dept: FAMILY MEDICINE CLINIC | Age: 54
End: 2024-03-26
Payer: COMMERCIAL

## 2024-03-26 VITALS
WEIGHT: 220 LBS | HEART RATE: 74 BPM | DIASTOLIC BLOOD PRESSURE: 70 MMHG | HEIGHT: 73 IN | OXYGEN SATURATION: 100 % | SYSTOLIC BLOOD PRESSURE: 110 MMHG | BODY MASS INDEX: 29.16 KG/M2

## 2024-03-26 DIAGNOSIS — H61.23 BILATERAL IMPACTED CERUMEN: Primary | ICD-10-CM

## 2024-03-26 PROCEDURE — G8419 CALC BMI OUT NRM PARAM NOF/U: HCPCS

## 2024-03-26 PROCEDURE — G8427 DOCREV CUR MEDS BY ELIG CLIN: HCPCS

## 2024-03-26 PROCEDURE — 69210 REMOVE IMPACTED EAR WAX UNI: CPT

## 2024-03-26 PROCEDURE — 1036F TOBACCO NON-USER: CPT

## 2024-03-26 PROCEDURE — 3017F COLORECTAL CA SCREEN DOC REV: CPT

## 2024-03-26 PROCEDURE — 99212 OFFICE O/P EST SF 10 MIN: CPT

## 2024-03-26 PROCEDURE — G8484 FLU IMMUNIZE NO ADMIN: HCPCS

## 2024-03-26 ASSESSMENT — ENCOUNTER SYMPTOMS
SINUS PRESSURE: 0
RHINORRHEA: 0
RESPIRATORY NEGATIVE: 1
EYES NEGATIVE: 1
SORE THROAT: 0

## 2024-03-26 NOTE — PROGRESS NOTES
[facility] PRIMARY CARE          ASSESSMENT/PLAN     Claude Prajapati Sr. is a 53 y.o. male who presents with:  Chief Complaint   Patient presents with    Ear Fullness     Pt is flying tomorrow and would like his ears checked for wax.            1. Bilateral impacted cerumen  Feeling pressure in right ear concern for cerumen obstruction which patient has history of.  He uses over-the-counter Debrox drops to prevent obstruction.  On examination there is significant obstruction to bilateral ear canals with cerumen.  Unable to visualize the TM of either side.  Irrigation is performed with peroxide and saline, patient tolerated procedure well.  TM is now visible both sides and normal..  There is mild pink color to the ear canal bilaterally patient denies tragal or mastoid tenderness.  Neck supple without masses.               PATIENT REFERRED TO:    Return if symptoms worsen or fail to improve.    DISCHARGE MEDICATIONS:  New Prescriptions    No medications on file     Cannot display discharge medications since this is not an admission.       Nickolas Walter, APRN - CNP      SUBJECTIVE/REVIEW OF SYSTEMS     Review of Systems   Constitutional: Negative.    HENT:  Positive for hearing loss. Negative for congestion, dental problem, ear discharge, ear pain, rhinorrhea, sinus pressure, sneezing and sore throat.    Eyes: Negative.    Respiratory: Negative.     Cardiovascular: Negative.    Skin: Negative.    Neurological:  Negative for dizziness and light-headedness.   Hematological:  Negative for adenopathy.   Psychiatric/Behavioral: Negative.         OBJECTIVE/PHYSICAL EXAM     Physical Exam  Constitutional:       Appearance: Normal appearance.   HENT:      Head: Normocephalic.      Right Ear: Tympanic membrane and ear canal normal. No tenderness. There is impacted cerumen. Tympanic membrane is not erythematous or bulging.      Left Ear: Tympanic membrane and ear canal normal. No tenderness. There is impacted cerumen.

## 2024-05-02 ENCOUNTER — OFFICE VISIT (OUTPATIENT)
Dept: FAMILY MEDICINE CLINIC | Age: 54
End: 2024-05-02

## 2024-05-02 VITALS
OXYGEN SATURATION: 99 % | TEMPERATURE: 98 F | HEART RATE: 66 BPM | DIASTOLIC BLOOD PRESSURE: 70 MMHG | SYSTOLIC BLOOD PRESSURE: 112 MMHG

## 2024-05-02 DIAGNOSIS — H61.23 BILATERAL IMPACTED CERUMEN: Primary | ICD-10-CM

## 2024-05-02 SDOH — ECONOMIC STABILITY: FOOD INSECURITY: WITHIN THE PAST 12 MONTHS, THE FOOD YOU BOUGHT JUST DIDN'T LAST AND YOU DIDN'T HAVE MONEY TO GET MORE.: NEVER TRUE

## 2024-05-02 SDOH — ECONOMIC STABILITY: HOUSING INSECURITY
IN THE LAST 12 MONTHS, WAS THERE A TIME WHEN YOU DID NOT HAVE A STEADY PLACE TO SLEEP OR SLEPT IN A SHELTER (INCLUDING NOW)?: NO

## 2024-05-02 SDOH — ECONOMIC STABILITY: INCOME INSECURITY: HOW HARD IS IT FOR YOU TO PAY FOR THE VERY BASICS LIKE FOOD, HOUSING, MEDICAL CARE, AND HEATING?: NOT VERY HARD

## 2024-05-02 SDOH — ECONOMIC STABILITY: FOOD INSECURITY: WITHIN THE PAST 12 MONTHS, YOU WORRIED THAT YOUR FOOD WOULD RUN OUT BEFORE YOU GOT MONEY TO BUY MORE.: NEVER TRUE

## 2024-05-02 NOTE — PROGRESS NOTES
[facility] PRIMARY CARE          ASSESSMENT/PLAN     Claude Prajapati Sr. is a 53 y.o. male who presents with:  Chief Complaint   Patient presents with    Ear Fullness     Wants them checked before he flies.      Fullness and tenderness to left ear.  Patient has history of chronic cerumen impaction.  On examination left ear is completely obstructed with cerumen TM is not visible.  Right ear similar-appearing TM not visible due to cerumen.  Denies mastoid or tragal tenderness.    Irrigation is performed using warm saline and peroxide  large amount of cerumen extracted from bilateral ears.  Patient tolerated procedure well reexamination after removal ear canals and TMs appear normal.        1. Bilateral impacted cerumen               PATIENT REFERRED TO:    Return if symptoms worsen or fail to improve.    DISCHARGE MEDICATIONS:  New Prescriptions    No medications on file     Cannot display discharge medications since this is not an admission.       Nickolas Walter APRN - CNP      SUBJECTIVE/REVIEW OF SYSTEMS     Review of Systems   Constitutional: Negative.    HENT:  Positive for hearing loss. Negative for congestion, ear discharge, ear pain, rhinorrhea, sinus pressure, sneezing and sore throat.    Eyes: Negative.    Respiratory: Negative.     Cardiovascular: Negative.    Skin: Negative.    Neurological:  Negative for dizziness and light-headedness.   Hematological:  Negative for adenopathy.   Psychiatric/Behavioral: Negative.         OBJECTIVE/PHYSICAL EXAM     Physical Exam  Constitutional:       Appearance: Normal appearance.   HENT:      Head: Normocephalic.      Right Ear: Tympanic membrane and ear canal normal. No tenderness. There is impacted cerumen. Tympanic membrane is not erythematous or bulging.      Left Ear: Tympanic membrane and ear canal normal. No tenderness. There is impacted cerumen. Tympanic membrane is not erythematous or bulging.      Mouth/Throat:      Lips: Pink.      Mouth: Mucous

## 2024-05-03 ASSESSMENT — ENCOUNTER SYMPTOMS
RHINORRHEA: 0
EYES NEGATIVE: 1
SORE THROAT: 0
RESPIRATORY NEGATIVE: 1
SINUS PRESSURE: 0

## 2024-06-21 ENCOUNTER — OFFICE VISIT (OUTPATIENT)
Dept: FAMILY MEDICINE CLINIC | Age: 54
End: 2024-06-21

## 2024-06-21 VITALS
HEART RATE: 78 BPM | SYSTOLIC BLOOD PRESSURE: 128 MMHG | TEMPERATURE: 98.8 F | WEIGHT: 233.8 LBS | HEIGHT: 73 IN | OXYGEN SATURATION: 99 % | BODY MASS INDEX: 30.99 KG/M2 | DIASTOLIC BLOOD PRESSURE: 76 MMHG

## 2024-06-21 DIAGNOSIS — D64.9 MILD ANEMIA: Chronic | ICD-10-CM

## 2024-06-21 DIAGNOSIS — E78.5 MILD HYPERLIPIDEMIA: Chronic | ICD-10-CM

## 2024-06-21 DIAGNOSIS — H61.23 BILATERAL IMPACTED CERUMEN: Primary | ICD-10-CM

## 2024-06-21 ASSESSMENT — ENCOUNTER SYMPTOMS
CONSTIPATION: 0
EYE PAIN: 0
PHOTOPHOBIA: 0
NAUSEA: 0
ABDOMINAL PAIN: 0
COLOR CHANGE: 0
VOICE CHANGE: 0
BLOOD IN STOOL: 0

## 2024-06-21 NOTE — PROGRESS NOTES
UT) TABS, Take by mouth, Disp: , Rfl:     Multiple Vitamins-Minerals (THERAPEUTIC MULTIVITAMIN-MINERALS) tablet, Take 1 tablet by mouth daily, Disp: , Rfl:     vitamin C (ASCORBIC ACID) 500 MG tablet, Take 1 tablet by mouth daily, Disp: , Rfl:    Past Medical History:   Diagnosis Date    Allergic rhinitis     Mild anemia     Mild hyperlipidemia      Past Surgical History:   Procedure Laterality Date    COLONOSCOPY N/A 12/22/2021    COLORECTAL CANCER SCREENING, NOT HIGH RISK WITH POLYPECTOMY performed by Ricardo Haque MD at [facility]    WISDOM TOOTH EXTRACTION       Family History   Problem Relation Age of Onset    Emphysema Father     Colon Cancer Neg Hx      Social History     Tobacco Use    Smoking status: Never     Passive exposure: Never    Smokeless tobacco: Never   Substance Use Topics    Alcohol use: No       PMH, Surgical Hx, Family Hx, and Social Hx reviewed and updated.  Health Maintenance reviewed.    Reviewed with the patient: current clinical status, medications, activities and diet.     Side effects, adverse effects of the medication prescribed today, as well as treatment plan/ rationale and result expectations have been discussed with the patient who expresses understanding and desires to proceed.  Close follow up to evaluate treatment results and for coordination of care.    I have reviewed the patient's medical history in detail and updated the computerized patient record.    Objective        Objective     Vitals:    06/21/24 1430   BP: 128/76   Site: Left Upper Arm   Position: Sitting   Cuff Size: Large Adult   Pulse: 78   Temp: 98.8 °F (37.1 °C)   TempSrc: Temporal   SpO2: 99%   Weight: 106.1 kg (233 lb 12.8 oz)   Height: 1.854 m (6' 1\")          Sydnie Sow MD

## 2024-07-24 ENCOUNTER — TELEPHONE (OUTPATIENT)
Dept: FAMILY MEDICINE CLINIC | Age: 54
End: 2024-07-24

## 2024-07-24 DIAGNOSIS — Z12.5 PROSTATE CANCER SCREENING: ICD-10-CM

## 2024-07-24 DIAGNOSIS — Z00.00 ANNUAL PHYSICAL EXAM: Primary | ICD-10-CM

## 2024-07-24 NOTE — TELEPHONE ENCOUNTER
Pt called bc he would like routine labs to be ordered prior to his visit on 9/11/24. Pt stated that  Ordered them prior to visit last year. Please advise.

## 2024-07-27 ENCOUNTER — HOSPITAL ENCOUNTER (OUTPATIENT)
Dept: LAB | Age: 54
Discharge: HOME | End: 2024-07-27
Payer: COMMERCIAL

## 2024-07-27 DIAGNOSIS — Z12.5 PROSTATE CANCER SCREENING: ICD-10-CM

## 2024-07-27 DIAGNOSIS — Z00.00 ANNUAL PHYSICAL EXAM: ICD-10-CM

## 2024-07-27 LAB
ALBUMIN SERPL-MCNC: 4.2 G/DL (ref 3.5–4.6)
ALP SERPL-CCNC: 62 U/L (ref 35–104)
ALT SERPL-CCNC: 18 U/L (ref 0–41)
ANION GAP SERPL CALCULATED.3IONS-SCNC: 11 MEQ/L (ref 9–15)
AST SERPL-CCNC: 20 U/L (ref 0–40)
BASOPHILS # BLD: 0 K/UL (ref 0–0.2)
BASOPHILS NFR BLD: 0.6 %
BILIRUB SERPL-MCNC: 0.6 MG/DL (ref 0.2–0.7)
BUN SERPL-MCNC: 22 MG/DL (ref 6–20)
CALCIUM SERPL-MCNC: 9.4 MG/DL (ref 8.5–9.9)
CHLORIDE SERPL-SCNC: 103 MEQ/L (ref 95–107)
CHOLEST SERPL-MCNC: 191 MG/DL (ref 0–199)
CO2 SERPL-SCNC: 27 MEQ/L (ref 20–31)
CREAT SERPL-MCNC: 1.36 MG/DL (ref 0.7–1.2)
EOSINOPHIL # BLD: 0.1 K/UL (ref 0–0.7)
EOSINOPHIL NFR BLD: 2.2 %
ERYTHROCYTE [DISTWIDTH] IN BLOOD BY AUTOMATED COUNT: 12 % (ref 11.5–14.5)
ESTIMATED AVERAGE GLUCOSE: 94 MG/DL
GLOBULIN SER CALC-MCNC: 3.2 G/DL (ref 2.3–3.5)
GLUCOSE SERPL-MCNC: 94 MG/DL (ref 70–99)
HBA1C MFR BLD: 4.9 % (ref 4–6)
HCT VFR BLD AUTO: 41.1 % (ref 42–52)
HDLC SERPL-MCNC: 56 MG/DL (ref 40–59)
HGB BLD-MCNC: 13.6 G/DL (ref 14–18)
LDL CHOLESTEROL: 124 MG/DL (ref 0–129)
LYMPHOCYTES # BLD: 1.8 K/UL (ref 1–4.8)
LYMPHOCYTES NFR BLD: 36.8 %
MCH RBC QN AUTO: 32.9 PG (ref 27–31.3)
MCHC RBC AUTO-ENTMCNC: 33.1 % (ref 33–37)
MCV RBC AUTO: 99.5 FL (ref 79–92.2)
MONOCYTES # BLD: 0.5 K/UL (ref 0.2–0.8)
MONOCYTES NFR BLD: 10.3 %
NEUTROPHILS # BLD: 2.5 K/UL (ref 1.4–6.5)
NEUTS SEG NFR BLD: 49.9 %
PLATELET # BLD AUTO: 304 K/UL (ref 130–400)
POTASSIUM SERPL-SCNC: 4.8 MEQ/L (ref 3.4–4.9)
PROT SERPL-MCNC: 7.4 G/DL (ref 6.3–8)
PSA SERPL-MCNC: 1 NG/ML (ref 0–4)
RBC # BLD AUTO: 4.13 M/UL (ref 4.7–6.1)
SODIUM SERPL-SCNC: 141 MEQ/L (ref 135–144)
TRIGLYCERIDE, FASTING: 54 MG/DL (ref 0–150)
WBC # BLD AUTO: 4.9 K/UL (ref 4.8–10.8)

## 2024-07-27 PROCEDURE — 83036 HEMOGLOBIN GLYCOSYLATED A1C: CPT

## 2024-07-27 PROCEDURE — 85025 COMPLETE CBC W/AUTO DIFF WBC: CPT

## 2024-07-27 PROCEDURE — 36415 COLL VENOUS BLD VENIPUNCTURE: CPT

## 2024-07-27 PROCEDURE — 84153 ASSAY OF PSA TOTAL: CPT

## 2024-07-27 PROCEDURE — 80053 COMPREHEN METABOLIC PANEL: CPT

## 2024-07-27 PROCEDURE — 80061 LIPID PANEL: CPT

## 2024-08-27 ENCOUNTER — OFFICE VISIT (OUTPATIENT)
Dept: FAMILY MEDICINE CLINIC | Age: 54
End: 2024-08-27
Payer: COMMERCIAL

## 2024-08-27 VITALS
BODY MASS INDEX: 30.88 KG/M2 | HEIGHT: 73 IN | SYSTOLIC BLOOD PRESSURE: 116 MMHG | HEART RATE: 72 BPM | DIASTOLIC BLOOD PRESSURE: 82 MMHG | OXYGEN SATURATION: 98 % | WEIGHT: 233 LBS | TEMPERATURE: 97.3 F

## 2024-08-27 DIAGNOSIS — H61.23 BILATERAL IMPACTED CERUMEN: Primary | ICD-10-CM

## 2024-08-27 DIAGNOSIS — S60.459A WOOD SPLINTER IN FINGER: ICD-10-CM

## 2024-08-27 PROCEDURE — 99213 OFFICE O/P EST LOW 20 MIN: CPT | Performed by: INTERNAL MEDICINE

## 2024-08-27 PROCEDURE — 69209 REMOVE IMPACTED EAR WAX UNI: CPT | Performed by: INTERNAL MEDICINE

## 2024-08-27 ASSESSMENT — ENCOUNTER SYMPTOMS
COLOR CHANGE: 0
CONSTIPATION: 0
EYE PAIN: 0
VOICE CHANGE: 0
BLOOD IN STOOL: 0
PHOTOPHOBIA: 0
NAUSEA: 0
ABDOMINAL PAIN: 0

## 2024-08-27 NOTE — PROGRESS NOTES
[facility]  [address]  [address]  [place] [address]  Dept: [phone]  Dept Fax: [phone]  Loc: [phone]     8/27/2024    Visit type: Acute care    Reason for Visit: Ear Fullness (Believes he has clogged ears) and Foreign Body in Skin (Splinter in right index finger, believes 3-4 weeks ago )       ASSESSMENT/PLAN   1. Bilateral impacted cerumen  2. Wood splinter in finger  -     Ambulatory referral to General Surgery    No follow-ups on file.  No orders of the defined types were placed in this encounter.       Subjective        Subjective    HPI:  Patient: Claude Prajapati  is a 53 y.o. male past medical history of mild anemia frequent cerumen impaction he feels his ears are blocked and also he has a splinter in his right index finger half of it was taken out and half is in the finger    Review of Systems   Constitutional:  Negative for appetite change and fatigue.   HENT:  Positive for ear pain. Negative for dental problem, hearing loss, nosebleeds and voice change.    Eyes:  Negative for photophobia, pain and visual disturbance.   Gastrointestinal:  Negative for abdominal pain, blood in stool, constipation and nausea.   Endocrine: Negative for cold intolerance, heat intolerance and polyuria.   Genitourinary:  Negative for difficulty urinating, genital sores and hematuria.   Musculoskeletal:  Negative for arthralgias, gait problem, joint swelling and myalgias.        Splinter broke in the right index finger couple of weeks ago   Skin:  Negative for color change and rash.   Allergic/Immunologic: Negative for environmental allergies, food allergies and immunocompromised state.   Neurological:  Negative for dizziness, tremors, seizures, syncope, weakness and headaches.   Hematological:  Does not bruise/bleed easily.   Psychiatric/Behavioral:  Negative for behavioral problems, decreased concentration, sleep disturbance and suicidal ideas. The  08/27/24.   Allergies   Allergen Reactions    Penicillin G Diarrhea    Pcn [Penicillins] Nausea And Vomiting       Current Outpatient Medications:     Cholecalciferol (VITAMIN D3) 125 MCG (5000 UT) TABS, Take by mouth, Disp: , Rfl:     Multiple Vitamins-Minerals (THERAPEUTIC MULTIVITAMIN-MINERALS) tablet, Take 1 tablet by mouth daily, Disp: , Rfl:     vitamin C (ASCORBIC ACID) 500 MG tablet, Take 1 tablet by mouth daily, Disp: , Rfl:    Past Medical History:   Diagnosis Date    Allergic rhinitis     Mild anemia     Mild hyperlipidemia      Past Surgical History:   Procedure Laterality Date    COLONOSCOPY N/A 12/22/2021    COLORECTAL CANCER SCREENING, NOT HIGH RISK WITH POLYPECTOMY performed by Ricardo Haque MD at [facility]    WISDOM TOOTH EXTRACTION       Family History   Problem Relation Age of Onset    Emphysema Father     Colon Cancer Neg Hx      Social History     Tobacco Use    Smoking status: Never     Passive exposure: Never    Smokeless tobacco: Never   Substance Use Topics    Alcohol use: No       PMH, Surgical Hx, Family Hx, and Social Hx reviewed and updated.  Health Maintenance reviewed.    Reviewed with the patient: current clinical status, medications, activities and diet.     Side effects, adverse effects of the medication prescribed today, as well as treatment plan/ rationale and result expectations have been discussed with the patient who expresses understanding and desires to proceed.  Close follow up to evaluate treatment results and for coordination of care.    I have reviewed the patient's medical history in detail and updated the computerized patient record.    Objective        Objective     Vitals:    08/27/24 1534   BP: 116/82   Pulse: 72   Temp: 97.3 °F (36.3 °C)   TempSrc: Temporal   SpO2: 98%   Weight: 105.7 kg (233 lb)   Height: 1.854 m (6' 1\")          Sydnie Sow MD

## 2024-08-28 ENCOUNTER — TELEPHONE (OUTPATIENT)
Dept: FAMILY MEDICINE CLINIC | Age: 54
End: 2024-08-28

## 2024-08-28 NOTE — TELEPHONE ENCOUNTER
Called patient to let him know that Dr. Sommers would be able to remove splinter on finger. When patient calls back please provide him with contact information to gen surg.

## 2024-09-06 ENCOUNTER — OFFICE VISIT (OUTPATIENT)
Dept: SURGERY | Age: 54
End: 2024-09-06
Payer: COMMERCIAL

## 2024-09-06 VITALS
HEIGHT: 73 IN | OXYGEN SATURATION: 98 % | WEIGHT: 229 LBS | BODY MASS INDEX: 30.35 KG/M2 | TEMPERATURE: 97.3 F | HEART RATE: 89 BPM

## 2024-09-06 DIAGNOSIS — S60.459A FOREIGN BODY OF FINGER: Primary | ICD-10-CM

## 2024-09-06 PROCEDURE — 99203 OFFICE O/P NEW LOW 30 MIN: CPT | Performed by: COLON & RECTAL SURGERY

## 2024-09-06 ASSESSMENT — ENCOUNTER SYMPTOMS
ABDOMINAL PAIN: 0
COLOR CHANGE: 0
CHEST TIGHTNESS: 0
DIARRHEA: 0
SHORTNESS OF BREATH: 0
CONSTIPATION: 0

## 2024-09-06 NOTE — PROGRESS NOTES
Subjective:      Patient ID: Claude Prajapati Sr. is a 53 y.o. male who presents for:  Chief Complaint   Patient presents with    New Patient       This is a 53-year-old male who noticed what appeared to be a splinter in the right index finger over the past 1 to 2 weeks.  He was referred for evaluation.    He is not having any pain.    Past medical and surgical history was reviewed        Past Medical History:   Diagnosis Date    Allergic rhinitis     Mild anemia     Mild hyperlipidemia      Past Surgical History:   Procedure Laterality Date    COLONOSCOPY N/A 12/22/2021    COLORECTAL CANCER SCREENING, NOT HIGH RISK WITH POLYPECTOMY performed by Ricardo Haque MD at [facility]    WISDOM TOOTH EXTRACTION       Social History     Socioeconomic History    Marital status:      Spouse name: Not on file    Number of children: Not on file    Years of education: Not on file    Highest education level: Not on file   Occupational History    Not on file   Tobacco Use    Smoking status: Never     Passive exposure: Never    Smokeless tobacco: Never   Vaping Use    Vaping status: Never Used   Substance and Sexual Activity    Alcohol use: No    Drug use: No    Sexual activity: Yes     Partners: Female   Other Topics Concern    Not on file   Social History Narrative    Not on file     Social Determinants of Health     Financial Resource Strain: Low Risk  (5/2/2024)    Overall Financial Resource Strain (CARDIA)     Difficulty of Paying Living Expenses: Not very hard   Food Insecurity: No Food Insecurity (5/2/2024)    Hunger Vital Sign     Worried About Running Out of Food in the Last Year: Never true     Ran Out of Food in the Last Year: Never true   Transportation Needs: Unknown (5/2/2024)    PRAPARE - Transportation     Lack of Transportation (Medical): Not on file     Lack of Transportation (Non-Medical): No   Physical Activity: Not on file   Stress: Not on file   Social Connections: Not on file   Intimate Partner

## 2024-09-11 ENCOUNTER — OFFICE VISIT (OUTPATIENT)
Dept: FAMILY MEDICINE CLINIC | Age: 54
End: 2024-09-11
Payer: COMMERCIAL

## 2024-09-11 VITALS
SYSTOLIC BLOOD PRESSURE: 116 MMHG | TEMPERATURE: 97.3 F | WEIGHT: 229 LBS | HEART RATE: 77 BPM | OXYGEN SATURATION: 98 % | BODY MASS INDEX: 30.21 KG/M2 | DIASTOLIC BLOOD PRESSURE: 78 MMHG

## 2024-09-11 DIAGNOSIS — N18.2 STAGE 2 CHRONIC KIDNEY DISEASE: ICD-10-CM

## 2024-09-11 DIAGNOSIS — S60.459A WOOD SPLINTER IN FINGER: ICD-10-CM

## 2024-09-11 DIAGNOSIS — Z00.00 ENCOUNTER FOR WELL ADULT EXAM WITHOUT ABNORMAL FINDINGS: Primary | ICD-10-CM

## 2024-09-11 DIAGNOSIS — L98.9 SKIN LESION OF FACE: ICD-10-CM

## 2024-09-11 PROCEDURE — 99396 PREV VISIT EST AGE 40-64: CPT | Performed by: FAMILY MEDICINE

## 2024-09-11 ASSESSMENT — ENCOUNTER SYMPTOMS
FACIAL SWELLING: 0
RHINORRHEA: 0
VOICE CHANGE: 0
CONSTIPATION: 0
BACK PAIN: 0
SHORTNESS OF BREATH: 0
PHOTOPHOBIA: 0
COUGH: 0
EYE PAIN: 0
NAUSEA: 0
TROUBLE SWALLOWING: 0
SINUS PRESSURE: 0
EYE ITCHING: 0
CHEST TIGHTNESS: 0
WHEEZING: 0
DIARRHEA: 0
APNEA: 0
EYE REDNESS: 0
EYE DISCHARGE: 0
ABDOMINAL DISTENTION: 0
ABDOMINAL PAIN: 0
BLOOD IN STOOL: 0
CHOKING: 0
ANAL BLEEDING: 0
SORE THROAT: 0

## 2024-11-08 ENCOUNTER — OFFICE VISIT (OUTPATIENT)
Dept: FAMILY MEDICINE CLINIC | Age: 54
End: 2024-11-08
Payer: COMMERCIAL

## 2024-11-08 VITALS
WEIGHT: 230 LBS | HEIGHT: 74 IN | DIASTOLIC BLOOD PRESSURE: 80 MMHG | HEART RATE: 73 BPM | OXYGEN SATURATION: 99 % | BODY MASS INDEX: 29.52 KG/M2 | TEMPERATURE: 97.1 F | SYSTOLIC BLOOD PRESSURE: 126 MMHG

## 2024-11-08 DIAGNOSIS — J04.0 ACUTE LARYNGITIS: Primary | ICD-10-CM

## 2024-11-08 PROCEDURE — 99213 OFFICE O/P EST LOW 20 MIN: CPT | Performed by: INTERNAL MEDICINE

## 2024-11-08 RX ORDER — AZITHROMYCIN 500 MG/1
500 TABLET, FILM COATED ORAL DAILY
Qty: 7 TABLET | Refills: 0 | Status: SHIPPED | OUTPATIENT
Start: 2024-11-08 | End: 2024-11-15

## 2024-11-08 ASSESSMENT — ENCOUNTER SYMPTOMS
NAUSEA: 0
PHOTOPHOBIA: 0
VOICE CHANGE: 1
CONSTIPATION: 0
COLOR CHANGE: 0
ABDOMINAL PAIN: 0
EYE PAIN: 0
BLOOD IN STOOL: 0

## 2024-11-08 NOTE — PROGRESS NOTES
[facility] PRIMARY CARE  [address]  [address]  [place] [place] [address]  Dept: [phone]  Dept Fax: [phone]  Loc: [phone]     11/8/2024    Visit type: Acute care    Reason for Visit: Hoarse (Patient presents today with complaints of hoarse/loss of voice x1 week )       ASSESSMENT/PLAN   1. Acute laryngitis    No follow-ups on file.  Orders Placed This Encounter   Medications    azithromycin (ZITHROMAX) 500 MG tablet     Sig: Take 1 tablet by mouth daily for 7 days     Dispense:  7 tablet     Refill:  0        Subjective        Subjective    HPI:  Patient: Claude Prajapati Sr. is a 54 y.o. male with a past medical history no significant illnesses complaining of congestion and losing voice for the last couple of days no sinus pain pressure no sore throat      Review of Systems   Constitutional:  Negative for appetite change and fatigue.   HENT:  Positive for congestion and voice change. Negative for dental problem, hearing loss and nosebleeds.    Eyes:  Negative for photophobia, pain and visual disturbance.   Gastrointestinal:  Negative for abdominal pain, blood in stool, constipation and nausea.   Endocrine: Negative for cold intolerance, heat intolerance and polyuria.   Genitourinary:  Negative for difficulty urinating, genital sores and hematuria.   Musculoskeletal:  Negative for arthralgias, gait problem, joint swelling and myalgias.   Skin:  Negative for color change and rash.   Allergic/Immunologic: Negative for environmental allergies, food allergies and immunocompromised state.   Neurological:  Negative for dizziness, tremors, seizures, syncope, weakness and headaches.   Hematological:  Does not bruise/bleed easily.   Psychiatric/Behavioral:  Negative for behavioral problems, decreased concentration, sleep disturbance and suicidal ideas. The patient is not nervous/anxious.       Physical Exam  Constitutional:       General: He is not in acute distress.

## 2024-12-23 ENCOUNTER — OFFICE VISIT (OUTPATIENT)
Dept: FAMILY MEDICINE CLINIC | Age: 54
End: 2024-12-23

## 2024-12-23 VITALS
DIASTOLIC BLOOD PRESSURE: 78 MMHG | OXYGEN SATURATION: 98 % | SYSTOLIC BLOOD PRESSURE: 124 MMHG | WEIGHT: 230 LBS | TEMPERATURE: 74 F | HEIGHT: 74 IN | BODY MASS INDEX: 29.52 KG/M2

## 2024-12-23 DIAGNOSIS — H61.23 BILATERAL IMPACTED CERUMEN: Primary | ICD-10-CM

## 2024-12-23 PROBLEM — J04.0 ACUTE LARYNGITIS: Status: RESOLVED | Noted: 2024-11-08 | Resolved: 2024-12-23

## 2024-12-23 PROBLEM — S60.459A: Status: RESOLVED | Noted: 2024-08-27 | Resolved: 2024-12-23

## 2024-12-23 NOTE — PROGRESS NOTES
Claude Prajapati Bud (: 1970) is a 54 y.o. male, Established patient, who presents today for:    Chief Complaint   Patient presents with    Same Day     Requesting ear flushing           Subjective     History of Present Illness  The patient is a 54-year-old male who presents today for an acute visit requesting ear lavage. He is a patient of  who is no longer at this office for bilateral impacted cerumen with hearing loss. He is not on any prescription medications.    He reports a history of recurrent cerumen impaction, necessitating regular ear lavages approximately every 4 months. He experienced the onset of his current episode of ear blockage last week. Despite the blockage, he notes a slight improvement in his hearing compared to previous episodes. He is scheduled to travel by air on Thursday and wishes to ensure his ears are clear prior to the flight. He is not experiencing any otalgia or otorrhea.               Results                 Objective     Vitals:  /78   Temp (!) 74 °F (23.3 °C)   Ht 1.88 m (6' 2.02\")   Wt 104.3 kg (230 lb)   SpO2 98%   BMI 29.52 kg/m²     Physical Exam  Physical Exam  Constitutional:       General: NAD     Appearance: Normal appearance. Is not ill-appearing.   HENT:      Ears: B/L canals with cerumen impaction. After lavage and curette removal, canals and tm's wnl  Neurological:      General: Grossly neurologically intact     Mental Status: She is alert and oriented to person, place, and time. Mental status is at baseline.   Psychiatric:         Mood and Affect: Mood normal.         Behavior: Behavior normal.         Thought Content: Thought content normal.         Judgment: Judgment normal.             Assessment & Plan  1. Bilateral impacted cerumen.  Recurrent for patient  Easily removed with lavage and curette  Cont debrox drops daily for prevention  Return to office as needed                Return if symptoms worsen or fail to improve.             An

## 2025-02-14 ENCOUNTER — OFFICE VISIT (OUTPATIENT)
Dept: FAMILY MEDICINE CLINIC | Age: 55
End: 2025-02-14

## 2025-02-14 VITALS
OXYGEN SATURATION: 98 % | HEART RATE: 72 BPM | TEMPERATURE: 97.2 F | DIASTOLIC BLOOD PRESSURE: 82 MMHG | SYSTOLIC BLOOD PRESSURE: 122 MMHG

## 2025-02-14 DIAGNOSIS — H61.23 BILATERAL IMPACTED CERUMEN: Primary | ICD-10-CM

## 2025-02-14 DIAGNOSIS — H90.0 CONDUCTIVE HEARING LOSS, BILATERAL: ICD-10-CM

## 2025-02-14 SDOH — ECONOMIC STABILITY: FOOD INSECURITY: WITHIN THE PAST 12 MONTHS, YOU WORRIED THAT YOUR FOOD WOULD RUN OUT BEFORE YOU GOT MONEY TO BUY MORE.: NEVER TRUE

## 2025-02-14 SDOH — ECONOMIC STABILITY: FOOD INSECURITY: WITHIN THE PAST 12 MONTHS, THE FOOD YOU BOUGHT JUST DIDN'T LAST AND YOU DIDN'T HAVE MONEY TO GET MORE.: NEVER TRUE

## 2025-02-14 ASSESSMENT — PATIENT HEALTH QUESTIONNAIRE - PHQ9
SUM OF ALL RESPONSES TO PHQ QUESTIONS 1-9: 0
SUM OF ALL RESPONSES TO PHQ QUESTIONS 1-9: 0
2. FEELING DOWN, DEPRESSED OR HOPELESS: NOT AT ALL
1. LITTLE INTEREST OR PLEASURE IN DOING THINGS: NOT AT ALL
SUM OF ALL RESPONSES TO PHQ QUESTIONS 1-9: 0
SUM OF ALL RESPONSES TO PHQ QUESTIONS 1-9: 0
SUM OF ALL RESPONSES TO PHQ9 QUESTIONS 1 & 2: 0

## 2025-02-14 ASSESSMENT — ENCOUNTER SYMPTOMS
EYE DISCHARGE: 0
SHORTNESS OF BREATH: 0
COLOR CHANGE: 0
COUGH: 0

## 2025-02-14 NOTE — PROGRESS NOTES
Claude Prajapati  (: 1970) is a 54 y.o. male, Established patient, who presents today for:    Chief Complaint   Patient presents with    Ear Fullness     Pt is here to have his ears checked to make sure he is not in need of an ear flush, he is traveling soon          Subjective     HPI:     Patient has history of cerumen impaction and frequent ear lavages  He is flying out of town next week and wants to make sure his ear canals are clear of any buildup  Patient reports mild reduction in hearing due to wax buildup  No ear pain  No drainage        Review of Systems   Constitutional:  Negative for chills and fever.   HENT:  Positive for hearing loss (Mild due to cerumen impaction bilaterally). Negative for congestion, ear discharge and ear pain.    Eyes:  Negative for discharge.   Respiratory:  Negative for cough and shortness of breath.    Musculoskeletal:  Negative for arthralgias and myalgias.   Skin:  Negative for color change and rash.   Allergic/Immunologic: Negative for environmental allergies.   Neurological:  Negative for dizziness, weakness, light-headedness, numbness and headaches.   Hematological:  Negative for adenopathy.        Past Medical History:   Diagnosis Date    Allergic rhinitis     Mild anemia     Mild hyperlipidemia       Social History     Socioeconomic History    Marital status:      Spouse name: Not on file    Number of children: Not on file    Years of education: Not on file    Highest education level: Not on file   Occupational History    Not on file   Tobacco Use    Smoking status: Never     Passive exposure: Never    Smokeless tobacco: Never   Vaping Use    Vaping status: Never Used   Substance and Sexual Activity    Alcohol use: No    Drug use: No    Sexual activity: Yes     Partners: Female   Other Topics Concern    Not on file   Social History Narrative    Not on file     Social Determinants of Health     Financial Resource Strain: Low Risk  (2024)    Overall

## 2025-04-21 ENCOUNTER — OFFICE VISIT (OUTPATIENT)
Dept: FAMILY MEDICINE CLINIC | Age: 55
End: 2025-04-21
Payer: COMMERCIAL

## 2025-04-21 VITALS
BODY MASS INDEX: 29.06 KG/M2 | HEART RATE: 65 BPM | SYSTOLIC BLOOD PRESSURE: 122 MMHG | TEMPERATURE: 97.5 F | WEIGHT: 226.4 LBS | DIASTOLIC BLOOD PRESSURE: 80 MMHG | OXYGEN SATURATION: 99 %

## 2025-04-21 DIAGNOSIS — H61.23 BILATERAL IMPACTED CERUMEN: Primary | ICD-10-CM

## 2025-04-21 PROCEDURE — 99213 OFFICE O/P EST LOW 20 MIN: CPT | Performed by: NURSE PRACTITIONER

## 2025-04-21 PROCEDURE — 69210 REMOVE IMPACTED EAR WAX UNI: CPT | Performed by: NURSE PRACTITIONER

## 2025-05-23 ENCOUNTER — OFFICE VISIT (OUTPATIENT)
Dept: FAMILY MEDICINE CLINIC | Age: 55
End: 2025-05-23

## 2025-05-23 VITALS
TEMPERATURE: 97.5 F | SYSTOLIC BLOOD PRESSURE: 122 MMHG | BODY MASS INDEX: 29.47 KG/M2 | DIASTOLIC BLOOD PRESSURE: 74 MMHG | WEIGHT: 229.6 LBS | OXYGEN SATURATION: 100 % | HEART RATE: 73 BPM

## 2025-05-23 DIAGNOSIS — M25.569 POSTERIOR KNEE PAIN, UNSPECIFIED LATERALITY: Primary | ICD-10-CM

## 2025-05-23 ASSESSMENT — ENCOUNTER SYMPTOMS
ABDOMINAL PAIN: 0
SHORTNESS OF BREATH: 0
WHEEZING: 0
CHEST TIGHTNESS: 0
COUGH: 0
COLOR CHANGE: 0

## 2025-05-23 NOTE — PROGRESS NOTES
oriented to person, place, and time.      Sensory: No sensory deficit.      Gait: Gait normal.   Psychiatric:         Mood and Affect: Mood normal.         Behavior: Behavior normal.         Thought Content: Thought content normal.         Judgment: Judgment normal.          Assessment & Plan   No results found for this visit on 05/23/25.   Assessment & Plan  1. Bilateral knee tightness.  - The bilateral nature of the symptoms suggests a musculoskeletal etiology rather than a thrombotic event.  - There is no evidence of lower extremity edema, swelling, redness, or discoloration.  - The possibility of a Baker's cyst was also considered but deemed unlikely due to the absence of pain upon leg flexion and the bilateral nature of the discomfort.  - he is advised to take ibuprofen for symptomatic relief and to avoid high-impact activities. If she experiences severe pain, calf pain, or pain radiating up the leg into the groin, an immediate visit to the emergency room is recommended for further evaluation and potential ultrasound examination.       1. Posterior knee pain, unspecified laterality  Comments:  Ibuprofen and ice as needed, avoid strenuous activity  Monitor for calf pain or swelling to the lower extremities                No follow-ups on file.    The patient (or guardian, if applicable) and other individuals in attendance with the patient were advised that Artificial Intelligence will be utilized during this visit to record, process the conversation to generate a clinical note, and support improvement of the AI technology. The patient (or guardian, if applicable) and other individuals in attendance at the appointment consented to the use of AI, including the recording.                        An electronic signature was used to authenticate this note.     Brianna Avelar, APRN - CNP

## 2025-05-29 ENCOUNTER — TELEPHONE (OUTPATIENT)
Age: 55
End: 2025-05-29

## 2025-05-29 NOTE — TELEPHONE ENCOUNTER
Pt called bc he went to the walk in last week and they told him that if his leg pain didn't decrease to call pcp and have them order an MRI of his legs. He still is having pain as well as cramping and stiffness. He would like a call back if it can be ordered. Please advise

## 2025-06-06 ENCOUNTER — OFFICE VISIT (OUTPATIENT)
Age: 55
End: 2025-06-06

## 2025-06-06 ENCOUNTER — HOSPITAL ENCOUNTER (OUTPATIENT)
Age: 55
Discharge: HOME | End: 2025-06-08
Payer: COMMERCIAL

## 2025-06-06 ENCOUNTER — HOSPITAL ENCOUNTER (OUTPATIENT)
Dept: GENERAL RADIOLOGY | Age: 55
Discharge: HOME | End: 2025-06-08
Attending: FAMILY MEDICINE
Payer: COMMERCIAL

## 2025-06-06 VITALS
HEART RATE: 68 BPM | DIASTOLIC BLOOD PRESSURE: 80 MMHG | BODY MASS INDEX: 29 KG/M2 | TEMPERATURE: 97.6 F | WEIGHT: 226 LBS | OXYGEN SATURATION: 98 % | SYSTOLIC BLOOD PRESSURE: 110 MMHG

## 2025-06-06 DIAGNOSIS — M25.562 ACUTE PAIN OF BOTH KNEES: Primary | ICD-10-CM

## 2025-06-06 DIAGNOSIS — M25.562 ACUTE PAIN OF BOTH KNEES: ICD-10-CM

## 2025-06-06 DIAGNOSIS — R20.0 NUMBNESS AND TINGLING OF BOTH LOWER EXTREMITIES: ICD-10-CM

## 2025-06-06 DIAGNOSIS — Z00.00 ANNUAL PHYSICAL EXAM: Primary | ICD-10-CM

## 2025-06-06 DIAGNOSIS — R20.2 NUMBNESS AND TINGLING OF BOTH LOWER EXTREMITIES: ICD-10-CM

## 2025-06-06 DIAGNOSIS — Z12.5 PROSTATE CANCER SCREENING: ICD-10-CM

## 2025-06-06 DIAGNOSIS — M25.561 ACUTE PAIN OF BOTH KNEES: Primary | ICD-10-CM

## 2025-06-06 DIAGNOSIS — M25.561 ACUTE PAIN OF BOTH KNEES: ICD-10-CM

## 2025-06-06 PROCEDURE — 73560 X-RAY EXAM OF KNEE 1 OR 2: CPT

## 2025-06-06 PROCEDURE — 72110 X-RAY EXAM L-2 SPINE 4/>VWS: CPT

## 2025-06-06 ASSESSMENT — ENCOUNTER SYMPTOMS
NAUSEA: 0
VOMITING: 0
COUGH: 0
SHORTNESS OF BREATH: 0
CHEST TIGHTNESS: 0
APNEA: 0
BLOOD IN STOOL: 0
DIARRHEA: 0
CONSTIPATION: 0
ABDOMINAL PAIN: 0

## 2025-06-06 NOTE — PROGRESS NOTES
Mild hyperlipidemia      Past Surgical History:   Procedure Laterality Date    COLONOSCOPY N/A 12/22/2021    COLORECTAL CANCER SCREENING, NOT HIGH RISK WITH POLYPECTOMY performed by Ricardo Haque MD at [facility]    WISDOM TOOTH EXTRACTION       Family History   Problem Relation Age of Onset    Emphysema Father     Colon Cancer Neg Hx      Social History     Socioeconomic History    Marital status:      Spouse name: Not on file    Number of children: Not on file    Years of education: Not on file    Highest education level: Not on file   Occupational History    Not on file   Tobacco Use    Smoking status: Never     Passive exposure: Never    Smokeless tobacco: Never   Vaping Use    Vaping status: Never Used   Substance and Sexual Activity    Alcohol use: No    Drug use: No    Sexual activity: Yes     Partners: Female   Other Topics Concern    Not on file   Social History Narrative    Not on file     Social Drivers of Health     Financial Resource Strain: Low Risk  (5/2/2024)    Overall Financial Resource Strain (CARDIA)     Difficulty of Paying Living Expenses: Not very hard   Food Insecurity: No Food Insecurity (2/14/2025)    Hunger Vital Sign     Worried About Running Out of Food in the Last Year: Never true     Ran Out of Food in the Last Year: Never true   Transportation Needs: No Transportation Needs (2/14/2025)    PRAPARE - Transportation     Lack of Transportation (Medical): No     Lack of Transportation (Non-Medical): No   Physical Activity: Not on file   Stress: Not on file   Social Connections: Not on file   Intimate Partner Violence: Not on file   Housing Stability: Low Risk  (2/14/2025)    Housing Stability Vital Sign     Unable to Pay for Housing in the Last Year: No     Number of Times Moved in the Last Year: 0     Homeless in the Last Year: No     Current Outpatient Medications on File Prior to Visit   Medication Sig Dispense Refill    Cholecalciferol (VITAMIN D3) 125 MCG (5000 UT)

## 2025-06-11 ENCOUNTER — RESULTS FOLLOW-UP (OUTPATIENT)
Age: 55
End: 2025-06-11

## 2025-06-11 DIAGNOSIS — M25.561 ACUTE PAIN OF BOTH KNEES: Primary | ICD-10-CM

## 2025-06-11 DIAGNOSIS — M25.562 ACUTE PAIN OF BOTH KNEES: Primary | ICD-10-CM

## 2025-06-11 DIAGNOSIS — R20.0 NUMBNESS AND TINGLING OF BOTH LOWER EXTREMITIES: ICD-10-CM

## 2025-06-11 DIAGNOSIS — R20.2 NUMBNESS AND TINGLING OF BOTH LOWER EXTREMITIES: ICD-10-CM

## 2025-07-28 ENCOUNTER — OFFICE VISIT (OUTPATIENT)
Dept: FAMILY MEDICINE CLINIC | Age: 55
End: 2025-07-28
Payer: COMMERCIAL

## 2025-07-28 VITALS
HEIGHT: 74 IN | OXYGEN SATURATION: 98 % | WEIGHT: 226 LBS | DIASTOLIC BLOOD PRESSURE: 72 MMHG | HEART RATE: 78 BPM | BODY MASS INDEX: 29 KG/M2 | SYSTOLIC BLOOD PRESSURE: 118 MMHG

## 2025-07-28 DIAGNOSIS — H61.23 BILATERAL IMPACTED CERUMEN: Primary | ICD-10-CM

## 2025-07-28 PROCEDURE — 69209 REMOVE IMPACTED EAR WAX UNI: CPT | Performed by: NURSE PRACTITIONER

## 2025-07-28 PROCEDURE — 99213 OFFICE O/P EST LOW 20 MIN: CPT | Performed by: NURSE PRACTITIONER

## 2025-07-28 ASSESSMENT — ENCOUNTER SYMPTOMS
COUGH: 0
WHEEZING: 0
SORE THROAT: 0
RHINORRHEA: 0
SHORTNESS OF BREATH: 0

## 2025-07-28 NOTE — PROGRESS NOTES
Subjective:      Patient ID: Claude Prajapati Sr. is a 54 y.o. male who presents today for:  Chief Complaint   Patient presents with    Walk In      C/o bilateral ears feeling blocked for a few days.  Patient states he comes in every 3-4 months.        Ear Fullness   Affected ear: both. This is a new problem. The current episode started in the past 7 days. The problem occurs constantly. The problem has been unchanged. There has been no fever. The patient is experiencing no pain. Associated symptoms include hearing loss (plugged). Pertinent negatives include no coughing, ear discharge, headaches, neck pain, rhinorrhea or sore throat. He has tried nothing for the symptoms.       Past Medical History:   Diagnosis Date    Allergic rhinitis     Mild anemia     Mild hyperlipidemia      Past Surgical History:   Procedure Laterality Date    COLONOSCOPY N/A 12/22/2021    COLORECTAL CANCER SCREENING, NOT HIGH RISK WITH POLYPECTOMY performed by Ricardo [facility]que MD at [facility]    WISDOM TOOTH EXTRACTION       Family History   Problem Relation Age of Onset    Emphysema Father     Colon Cancer Neg Hx      Allergies   Allergen Reactions    Penicillin G Diarrhea    Pcn [Penicillins] Nausea And Vomiting         Review of Systems   Constitutional:  Negative for chills, fatigue and fever.   HENT:  Positive for hearing loss (plugged). Negative for ear discharge, ear pain, rhinorrhea and sore throat.    Respiratory:  Negative for cough, shortness of breath and wheezing.    Cardiovascular:  Negative for chest pain.   Musculoskeletal:  Negative for neck pain.   Neurological:  Negative for headaches.       Objective:   /72   Pulse 78   Ht 1.88 m (6' 2.02\")   Wt 102.5 kg (226 lb)   SpO2 98%   BMI 29.00 kg/m²     Physical Exam  Constitutional:       General: He is not in acute distress.     Appearance: He is well-developed.   HENT:      Head: Normocephalic.      Right Ear: Tympanic membrane, ear canal and external ear

## 2025-07-31 ENCOUNTER — OFFICE VISIT (OUTPATIENT)
Dept: ORTHOPEDIC SURGERY | Age: 55
End: 2025-07-31

## 2025-07-31 VITALS
BODY MASS INDEX: 29 KG/M2 | WEIGHT: 226 LBS | HEIGHT: 74 IN | HEART RATE: 68 BPM | TEMPERATURE: 97.9 F | OXYGEN SATURATION: 98 %

## 2025-07-31 DIAGNOSIS — M62.89 MUSCLE FATIGUE: Primary | ICD-10-CM

## 2025-07-31 NOTE — PROGRESS NOTES
Orthopedic Surgery and Sports Medicine    Subjective:      Patient ID: Claude Prajapati SrBud is a 54 y.o. male who presents today for:  Chief Complaint   Patient presents with    New Patient     Bilateral knee pain for about 2 months. X-rays in chart. Intermittent pain, today is 2-3/10. No PT or oral pain meds.       Claude Prajapati Sr. was sent to me by Jad Singh MD for evaluation of the patient's bilateral knee pain.      SHIELA Arce is a 54-year-old male who presents today for evaluation of his bilateral lower extremities.  He reports approximately 2 months of soreness in his lower legs.  He states that he does not have any pain in his knees necessarily.  He just states that they feel sore, mostly in the quadriceps and calves.  He has not had any changes in his activity level.  No recent injury.  The soreness is intermittent and mostly after he has been standing a lot.  He does not have any pain or soreness at rest.  He has not had any treatments yet.  He has not taking any medication other than vitamins.    Previous treatment: None  NSAIDs: No  Physical therapy: No    Occupation:  at [employer]  Workers Compensation:   Have you missed work for this issue? No  Is this issue being addressed under a worker's compensation claim? No    Past Medical History:   Diagnosis Date    Allergic rhinitis     Mild anemia     Mild hyperlipidemia       Past Surgical History:   Procedure Laterality Date    COLONOSCOPY N/A 12/22/2021    COLORECTAL CANCER SCREENING, NOT HIGH RISK WITH POLYPECTOMY performed by Ricardo Haque MD at [facility]    WISDOM TOOTH EXTRACTION       Social History     Socioeconomic History    Marital status:      Spouse name: Not on file    Number of children: Not on file    Years of education: Not on file    Highest education level: Not on file   Occupational History    Not on file   Tobacco Use    Smoking status: Never     Passive exposure: Never    Smokeless tobacco:

## 2025-09-03 ENCOUNTER — OFFICE VISIT (OUTPATIENT)
Dept: FAMILY MEDICINE CLINIC | Age: 55
End: 2025-09-03
Payer: COMMERCIAL

## 2025-09-03 VITALS
OXYGEN SATURATION: 99 % | RESPIRATION RATE: 18 BRPM | SYSTOLIC BLOOD PRESSURE: 122 MMHG | DIASTOLIC BLOOD PRESSURE: 70 MMHG | HEART RATE: 75 BPM | TEMPERATURE: 98.1 F

## 2025-09-03 DIAGNOSIS — B96.89 ACUTE BACTERIAL SINUSITIS: Primary | ICD-10-CM

## 2025-09-03 DIAGNOSIS — J01.90 ACUTE BACTERIAL SINUSITIS: Primary | ICD-10-CM

## 2025-09-03 PROBLEM — H52.4 BILATERAL PRESBYOPIA: Status: RESOLVED | Noted: 2019-08-13 | Resolved: 2025-09-03

## 2025-09-03 PROBLEM — H90.11 CONDUCTIVE HEARING LOSS OF RIGHT EAR WITH UNRESTRICTED HEARING OF LEFT EAR: Status: RESOLVED | Noted: 2021-10-15 | Resolved: 2025-09-03

## 2025-09-03 PROBLEM — H61.23 BILATERAL IMPACTED CERUMEN: Status: RESOLVED | Noted: 2024-06-21 | Resolved: 2025-09-03

## 2025-09-03 PROCEDURE — 99214 OFFICE O/P EST MOD 30 MIN: CPT | Performed by: INTERNAL MEDICINE

## 2025-09-03 RX ORDER — AZITHROMYCIN 500 MG/1
500 TABLET, FILM COATED ORAL DAILY
Qty: 7 TABLET | Refills: 0 | Status: SHIPPED | OUTPATIENT
Start: 2025-09-03 | End: 2025-09-10

## 2025-09-03 ASSESSMENT — ENCOUNTER SYMPTOMS
CONSTIPATION: 0
PHOTOPHOBIA: 0
BLOOD IN STOOL: 0
SINUS PAIN: 1
COLOR CHANGE: 0
ABDOMINAL PAIN: 0
NAUSEA: 0
EYE PAIN: 0
SINUS PRESSURE: 1
VOICE CHANGE: 0

## 2025-09-06 ENCOUNTER — HOSPITAL ENCOUNTER (OUTPATIENT)
Dept: LAB | Age: 55
Discharge: HOME | End: 2025-09-06
Payer: COMMERCIAL

## 2025-09-06 DIAGNOSIS — Z00.00 ANNUAL PHYSICAL EXAM: ICD-10-CM

## 2025-09-06 DIAGNOSIS — Z12.5 PROSTATE CANCER SCREENING: ICD-10-CM

## 2025-09-06 LAB
ALBUMIN SERPL-MCNC: 4.2 G/DL (ref 3.5–4.6)
ALP SERPL-CCNC: 63 U/L (ref 35–104)
ALT SERPL-CCNC: 16 U/L (ref 0–41)
ANION GAP SERPL CALCULATED.3IONS-SCNC: 10 MEQ/L (ref 9–15)
AST SERPL-CCNC: 22 U/L (ref 0–40)
BASOPHILS # BLD: 0 K/UL (ref 0–0.2)
BASOPHILS NFR BLD: 0.2 %
BILIRUB SERPL-MCNC: 0.7 MG/DL (ref 0.2–0.7)
BILIRUB UR QL STRIP: NEGATIVE
BUN SERPL-MCNC: 14 MG/DL (ref 6–20)
CALCIUM SERPL-MCNC: 9.2 MG/DL (ref 8.5–9.9)
CHLORIDE SERPL-SCNC: 104 MEQ/L (ref 95–107)
CHOLEST SERPL-MCNC: 159 MG/DL (ref 0–199)
CLARITY UR: CLEAR
CO2 SERPL-SCNC: 26 MEQ/L (ref 20–31)
COLOR UR: YELLOW
CREAT SERPL-MCNC: 1.28 MG/DL (ref 0.7–1.2)
EOSINOPHIL # BLD: 0.2 K/UL (ref 0–0.7)
EOSINOPHIL NFR BLD: 3.9 %
ERYTHROCYTE [DISTWIDTH] IN BLOOD BY AUTOMATED COUNT: 11.8 % (ref 11.5–14.5)
GLOBULIN SER CALC-MCNC: 3.3 G/DL (ref 2.3–3.5)
GLUCOSE SERPL-MCNC: 91 MG/DL (ref 70–99)
GLUCOSE UR STRIP-MCNC: NEGATIVE MG/DL
HCT VFR BLD AUTO: 40.2 % (ref 42–52)
HDLC SERPL-MCNC: 45 MG/DL (ref 40–59)
HGB BLD-MCNC: 13.2 G/DL (ref 14–18)
HGB UR QL STRIP: NEGATIVE
KETONES UR STRIP-MCNC: NEGATIVE MG/DL
LDL CHOLESTEROL: 105 MG/DL (ref 0–129)
LEUKOCYTE ESTERASE UR QL STRIP: NEGATIVE
LYMPHOCYTES # BLD: 1.7 K/UL (ref 1–4.8)
LYMPHOCYTES NFR BLD: 38.8 %
MCH RBC QN AUTO: 32.7 PG (ref 27–31.3)
MCHC RBC AUTO-ENTMCNC: 32.8 % (ref 33–37)
MCV RBC AUTO: 99.5 FL (ref 79–92.2)
MONOCYTES # BLD: 0.5 K/UL (ref 0.2–0.8)
MONOCYTES NFR BLD: 12.1 %
NEUTROPHILS # BLD: 2 K/UL (ref 1.4–6.5)
NEUTS SEG NFR BLD: 44.8 %
NITRITE UR QL STRIP: NEGATIVE
PH UR STRIP: 7.5 [PH] (ref 5–9)
PLATELET # BLD AUTO: 319 K/UL (ref 130–400)
POTASSIUM SERPL-SCNC: 4.5 MEQ/L (ref 3.4–4.9)
PROT SERPL-MCNC: 7.5 G/DL (ref 6.3–8)
PROT UR STRIP-MCNC: NEGATIVE MG/DL
PSA SERPL-MCNC: 0.66 NG/ML (ref 0–4)
RBC # BLD AUTO: 4.04 M/UL (ref 4.7–6.1)
SODIUM SERPL-SCNC: 140 MEQ/L (ref 135–144)
SP GR UR STRIP: 1.02 (ref 1–1.03)
TRIGLYCERIDE, FASTING: 43 MG/DL (ref 0–150)
URINE REFLEX TO CULTURE: NORMAL
UROBILINOGEN UR STRIP-ACNC: 1 E.U./DL
WBC # BLD AUTO: 4.4 K/UL (ref 4.8–10.8)

## 2025-09-06 PROCEDURE — 83036 HEMOGLOBIN GLYCOSYLATED A1C: CPT

## 2025-09-06 PROCEDURE — 84153 ASSAY OF PSA TOTAL: CPT

## 2025-09-06 PROCEDURE — 80061 LIPID PANEL: CPT

## 2025-09-06 PROCEDURE — 81003 URINALYSIS AUTO W/O SCOPE: CPT

## 2025-09-06 PROCEDURE — 80053 COMPREHEN METABOLIC PANEL: CPT

## 2025-09-06 PROCEDURE — 85025 COMPLETE CBC W/AUTO DIFF WBC: CPT

## 2025-09-06 PROCEDURE — 36415 COLL VENOUS BLD VENIPUNCTURE: CPT

## 2025-09-07 LAB
ESTIMATED AVERAGE GLUCOSE: 88 MG/DL
HBA1C MFR BLD: 4.7 % (ref 4–6)

## (undated) DEVICE — TRAP POLYP BALEEN

## (undated) DEVICE — BRUSH ENDO CLN L90.5IN SHTH DIA1.7MM BRIST DIA5-7MM 2-6MM

## (undated) DEVICE — Device: Brand: ENDO SMARTCAP

## (undated) DEVICE — TUBE SET 96 MM 64 MM H2O PERISTALTIC STD AUX CHANNEL

## (undated) DEVICE — SINGLE PORT MANIFOLD: Brand: NEPTUNE 2

## (undated) DEVICE — TUBING, SUCTION, 1/4" X 10', STRAIGHT: Brand: MEDLINE

## (undated) DEVICE — ENDO CARRY-ON PROCEDURE KIT: Brand: ENDO CARRY-ON PROCEDURE KIT

## (undated) DEVICE — FORCEPS BX L240CM JAW DIA2.8MM L CAP W/ NDL MIC MESH TOOTH

## (undated) DEVICE — SNARE ENDOSCP AD L240CM LOOP W10MM SHTH DIA2.4MM RND INSUL